# Patient Record
Sex: MALE | Race: WHITE | NOT HISPANIC OR LATINO | Employment: UNEMPLOYED | URBAN - METROPOLITAN AREA
[De-identification: names, ages, dates, MRNs, and addresses within clinical notes are randomized per-mention and may not be internally consistent; named-entity substitution may affect disease eponyms.]

---

## 2025-03-12 ENCOUNTER — APPOINTMENT (OUTPATIENT)
Dept: RADIOLOGY | Facility: HOSPITAL | Age: 40
End: 2025-03-12
Payer: MEDICAID

## 2025-03-12 ENCOUNTER — HOSPITAL ENCOUNTER (OUTPATIENT)
Facility: HOSPITAL | Age: 40
Setting detail: OBSERVATION
LOS: 1 days | Discharge: HOME | End: 2025-03-14
Attending: EMERGENCY MEDICINE | Admitting: EMERGENCY MEDICINE
Payer: MEDICAID

## 2025-03-12 ENCOUNTER — TELEPHONE (OUTPATIENT)
Dept: NEUROLOGY | Facility: CLINIC | Age: 40
End: 2025-03-12
Payer: MEDICAID

## 2025-03-12 DIAGNOSIS — G35 MS (MULTIPLE SCLEROSIS) (MULTI): Primary | ICD-10-CM

## 2025-03-12 LAB
ALBUMIN SERPL BCP-MCNC: 5.1 G/DL (ref 3.4–5)
ALP SERPL-CCNC: 55 U/L (ref 33–120)
ALT SERPL W P-5'-P-CCNC: 6 U/L (ref 10–52)
ANION GAP SERPL CALC-SCNC: 11 MMOL/L (ref 10–20)
APPEARANCE UR: ABNORMAL
AST SERPL W P-5'-P-CCNC: 11 U/L (ref 9–39)
BASOPHILS # BLD AUTO: 0.03 X10*3/UL (ref 0–0.1)
BASOPHILS # BLD AUTO: 0.04 X10*3/UL (ref 0–0.1)
BASOPHILS NFR BLD AUTO: 0.5 %
BASOPHILS NFR BLD AUTO: 0.7 %
BILIRUB SERPL-MCNC: 1 MG/DL (ref 0–1.2)
BILIRUB UR STRIP.AUTO-MCNC: NEGATIVE MG/DL
BUN SERPL-MCNC: 23 MG/DL (ref 6–23)
CALCIUM SERPL-MCNC: 9.7 MG/DL (ref 8.6–10.6)
CHLORIDE SERPL-SCNC: 106 MMOL/L (ref 98–107)
CO2 SERPL-SCNC: 26 MMOL/L (ref 21–32)
COLOR UR: YELLOW
CREAT SERPL-MCNC: 0.88 MG/DL (ref 0.5–1.3)
EGFRCR SERPLBLD CKD-EPI 2021: >90 ML/MIN/1.73M*2
EOSINOPHIL # BLD AUTO: 0.03 X10*3/UL (ref 0–0.7)
EOSINOPHIL # BLD AUTO: 0.03 X10*3/UL (ref 0–0.7)
EOSINOPHIL NFR BLD AUTO: 0.5 %
EOSINOPHIL NFR BLD AUTO: 0.6 %
ERYTHROCYTE [DISTWIDTH] IN BLOOD BY AUTOMATED COUNT: 11.6 % (ref 11.5–14.5)
ERYTHROCYTE [DISTWIDTH] IN BLOOD BY AUTOMATED COUNT: 11.7 % (ref 11.5–14.5)
GLUCOSE SERPL-MCNC: 96 MG/DL (ref 74–99)
GLUCOSE UR STRIP.AUTO-MCNC: NORMAL MG/DL
HBV CORE AB SER QL: NONREACTIVE
HBV SURFACE AB SER-ACNC: <3.1 MIU/ML
HBV SURFACE AG SERPL QL IA: NONREACTIVE
HCT VFR BLD AUTO: 40.6 % (ref 41–52)
HCT VFR BLD AUTO: 44.1 % (ref 41–52)
HCV AB SER QL: NONREACTIVE
HGB BLD-MCNC: 15 G/DL (ref 13.5–17.5)
HGB BLD-MCNC: 16.2 G/DL (ref 13.5–17.5)
HIV 1+2 AB+HIV1 P24 AG SERPL QL IA: NONREACTIVE
HOLD SPECIMEN: NORMAL
IGA SERPL-MCNC: 180 MG/DL (ref 70–400)
IGG SERPL-MCNC: 753 MG/DL (ref 700–1600)
IGM SERPL-MCNC: 155 MG/DL (ref 40–230)
IMM GRANULOCYTES # BLD AUTO: 0.02 X10*3/UL (ref 0–0.7)
IMM GRANULOCYTES # BLD AUTO: 0.02 X10*3/UL (ref 0–0.7)
IMM GRANULOCYTES NFR BLD AUTO: 0.4 % (ref 0–0.9)
IMM GRANULOCYTES NFR BLD AUTO: 0.4 % (ref 0–0.9)
KETONES UR STRIP.AUTO-MCNC: NEGATIVE MG/DL
LEUKOCYTE ESTERASE UR QL STRIP.AUTO: ABNORMAL
LYMPHOCYTES # BLD AUTO: 1.02 X10*3/UL (ref 1.2–4.8)
LYMPHOCYTES # BLD AUTO: 1.42 X10*3/UL (ref 1.2–4.8)
LYMPHOCYTES NFR BLD AUTO: 18.9 %
LYMPHOCYTES NFR BLD AUTO: 25.9 %
MCH RBC QN AUTO: 31.2 PG (ref 26–34)
MCH RBC QN AUTO: 31.8 PG (ref 26–34)
MCHC RBC AUTO-ENTMCNC: 36.7 G/DL (ref 32–36)
MCHC RBC AUTO-ENTMCNC: 36.9 G/DL (ref 32–36)
MCV RBC AUTO: 85 FL (ref 80–100)
MCV RBC AUTO: 86 FL (ref 80–100)
MONOCYTES # BLD AUTO: 0.42 X10*3/UL (ref 0.1–1)
MONOCYTES # BLD AUTO: 0.45 X10*3/UL (ref 0.1–1)
MONOCYTES NFR BLD AUTO: 7.8 %
MONOCYTES NFR BLD AUTO: 8.2 %
NEUTROPHILS # BLD AUTO: 3.54 X10*3/UL (ref 1.2–7.7)
NEUTROPHILS # BLD AUTO: 3.86 X10*3/UL (ref 1.2–7.7)
NEUTROPHILS NFR BLD AUTO: 64.5 %
NEUTROPHILS NFR BLD AUTO: 71.6 %
NITRITE UR QL STRIP.AUTO: NEGATIVE
NRBC BLD-RTO: 0 /100 WBCS (ref 0–0)
NRBC BLD-RTO: 0 /100 WBCS (ref 0–0)
PH UR STRIP.AUTO: 6 [PH]
PLATELET # BLD AUTO: 188 X10*3/UL (ref 150–450)
PLATELET # BLD AUTO: 221 X10*3/UL (ref 150–450)
POTASSIUM SERPL-SCNC: 3.7 MMOL/L (ref 3.5–5.3)
PROT SERPL-MCNC: 7.7 G/DL (ref 6.4–8.2)
PROT UR STRIP.AUTO-MCNC: ABNORMAL MG/DL
RBC # BLD AUTO: 4.71 X10*6/UL (ref 4.5–5.9)
RBC # BLD AUTO: 5.19 X10*6/UL (ref 4.5–5.9)
RBC # UR STRIP.AUTO: NEGATIVE MG/DL
RBC #/AREA URNS AUTO: NORMAL /HPF
SODIUM SERPL-SCNC: 139 MMOL/L (ref 136–145)
SP GR UR STRIP.AUTO: 1.03
SQUAMOUS #/AREA URNS AUTO: NORMAL /HPF
UROBILINOGEN UR STRIP.AUTO-MCNC: ABNORMAL MG/DL
VARICELLA ZOSTER IGG INDEX: 0.9 IA
VZV IGG SER QL IA: ABNORMAL
WBC # BLD AUTO: 5.4 X10*3/UL (ref 4.4–11.3)
WBC # BLD AUTO: 5.5 X10*3/UL (ref 4.4–11.3)
WBC #/AREA URNS AUTO: NORMAL /HPF

## 2025-03-12 PROCEDURE — 1210000001 HC SEMI-PRIVATE ROOM DAILY

## 2025-03-12 PROCEDURE — 70450 CT HEAD/BRAIN W/O DYE: CPT

## 2025-03-12 PROCEDURE — A9575 INJ GADOTERATE MEGLUMI 0.1ML: HCPCS | Mod: SE | Performed by: EMERGENCY MEDICINE

## 2025-03-12 PROCEDURE — 99285 EMERGENCY DEPT VISIT HI MDM: CPT | Performed by: PHYSICIAN ASSISTANT

## 2025-03-12 PROCEDURE — 82784 ASSAY IGA/IGD/IGG/IGM EACH: CPT | Performed by: STUDENT IN AN ORGANIZED HEALTH CARE EDUCATION/TRAINING PROGRAM

## 2025-03-12 PROCEDURE — 81001 URINALYSIS AUTO W/SCOPE: CPT | Performed by: NURSE PRACTITIONER

## 2025-03-12 PROCEDURE — 86769 SARS-COV-2 COVID-19 ANTIBODY: CPT | Performed by: STUDENT IN AN ORGANIZED HEALTH CARE EDUCATION/TRAINING PROGRAM

## 2025-03-12 PROCEDURE — 87340 HEPATITIS B SURFACE AG IA: CPT | Performed by: STUDENT IN AN ORGANIZED HEALTH CARE EDUCATION/TRAINING PROGRAM

## 2025-03-12 PROCEDURE — 84075 ASSAY ALKALINE PHOSPHATASE: CPT | Performed by: NURSE PRACTITIONER

## 2025-03-12 PROCEDURE — 86706 HEP B SURFACE ANTIBODY: CPT | Performed by: STUDENT IN AN ORGANIZED HEALTH CARE EDUCATION/TRAINING PROGRAM

## 2025-03-12 PROCEDURE — 87389 HIV-1 AG W/HIV-1&-2 AB AG IA: CPT | Performed by: STUDENT IN AN ORGANIZED HEALTH CARE EDUCATION/TRAINING PROGRAM

## 2025-03-12 PROCEDURE — 85025 COMPLETE CBC W/AUTO DIFF WBC: CPT | Performed by: STUDENT IN AN ORGANIZED HEALTH CARE EDUCATION/TRAINING PROGRAM

## 2025-03-12 PROCEDURE — 86803 HEPATITIS C AB TEST: CPT | Performed by: STUDENT IN AN ORGANIZED HEALTH CARE EDUCATION/TRAINING PROGRAM

## 2025-03-12 PROCEDURE — 36415 COLL VENOUS BLD VENIPUNCTURE: CPT | Performed by: STUDENT IN AN ORGANIZED HEALTH CARE EDUCATION/TRAINING PROGRAM

## 2025-03-12 PROCEDURE — 86787 VARICELLA-ZOSTER ANTIBODY: CPT | Performed by: STUDENT IN AN ORGANIZED HEALTH CARE EDUCATION/TRAINING PROGRAM

## 2025-03-12 PROCEDURE — 87086 URINE CULTURE/COLONY COUNT: CPT | Performed by: NURSE PRACTITIONER

## 2025-03-12 PROCEDURE — 86704 HEP B CORE ANTIBODY TOTAL: CPT | Performed by: STUDENT IN AN ORGANIZED HEALTH CARE EDUCATION/TRAINING PROGRAM

## 2025-03-12 PROCEDURE — 88185 FLOWCYTOMETRY/TC ADD-ON: CPT | Performed by: STUDENT IN AN ORGANIZED HEALTH CARE EDUCATION/TRAINING PROGRAM

## 2025-03-12 PROCEDURE — 36415 COLL VENOUS BLD VENIPUNCTURE: CPT | Performed by: NURSE PRACTITIONER

## 2025-03-12 PROCEDURE — 2550000001 HC RX 255 CONTRASTS: Mod: SE | Performed by: EMERGENCY MEDICINE

## 2025-03-12 PROCEDURE — 72157 MRI CHEST SPINE W/O & W/DYE: CPT

## 2025-03-12 PROCEDURE — 72156 MRI NECK SPINE W/O & W/DYE: CPT

## 2025-03-12 PROCEDURE — 70553 MRI BRAIN STEM W/O & W/DYE: CPT

## 2025-03-12 PROCEDURE — 70450 CT HEAD/BRAIN W/O DYE: CPT | Performed by: RADIOLOGY

## 2025-03-12 PROCEDURE — 99285 EMERGENCY DEPT VISIT HI MDM: CPT | Mod: 25 | Performed by: EMERGENCY MEDICINE

## 2025-03-12 PROCEDURE — 86481 TB AG RESPONSE T-CELL SUSP: CPT | Performed by: STUDENT IN AN ORGANIZED HEALTH CARE EDUCATION/TRAINING PROGRAM

## 2025-03-12 PROCEDURE — 72157 MRI CHEST SPINE W/O & W/DYE: CPT | Performed by: RADIOLOGY

## 2025-03-12 PROCEDURE — 99223 1ST HOSP IP/OBS HIGH 75: CPT | Performed by: STUDENT IN AN ORGANIZED HEALTH CARE EDUCATION/TRAINING PROGRAM

## 2025-03-12 PROCEDURE — 85025 COMPLETE CBC W/AUTO DIFF WBC: CPT | Performed by: NURSE PRACTITIONER

## 2025-03-12 PROCEDURE — 70553 MRI BRAIN STEM W/O & W/DYE: CPT | Performed by: RADIOLOGY

## 2025-03-12 PROCEDURE — 72156 MRI NECK SPINE W/O & W/DYE: CPT | Performed by: RADIOLOGY

## 2025-03-12 RX ORDER — GADOTERATE MEGLUMINE 376.9 MG/ML
15 INJECTION INTRAVENOUS
Status: COMPLETED | OUTPATIENT
Start: 2025-03-12 | End: 2025-03-12

## 2025-03-12 RX ADMIN — GADOTERATE MEGLUMINE 12 ML: 376.9 INJECTION INTRAVENOUS at 20:47

## 2025-03-12 ASSESSMENT — COLUMBIA-SUICIDE SEVERITY RATING SCALE - C-SSRS
6. HAVE YOU EVER DONE ANYTHING, STARTED TO DO ANYTHING, OR PREPARED TO DO ANYTHING TO END YOUR LIFE?: NO
2. HAVE YOU ACTUALLY HAD ANY THOUGHTS OF KILLING YOURSELF?: NO
1. IN THE PAST MONTH, HAVE YOU WISHED YOU WERE DEAD OR WISHED YOU COULD GO TO SLEEP AND NOT WAKE UP?: NO

## 2025-03-12 ASSESSMENT — PAIN - FUNCTIONAL ASSESSMENT: PAIN_FUNCTIONAL_ASSESSMENT: 0-10

## 2025-03-12 ASSESSMENT — PAIN SCALES - GENERAL
PAINLEVEL_OUTOF10: 0 - NO PAIN
PAINLEVEL_OUTOF10: 0 - NO PAIN

## 2025-03-12 NOTE — ED PROVIDER NOTES
"Emergency Department Encounter  [facility] EMERGENCY MEDICINE    Patient: Cale Armenta  MRN: [id number]  : 1985  Date of Evaluation: 3/12/2025  ED Provider: Lynne Natarajan PA-C        History of Present Illness     39 year old male who presented today with worsening Multiple Sclerosis symptoms. Pt states that he has not seen his neurologist since . He states that he did not have insurance and had trouble obtaining disability assistance. Pt states that he had a hard time walking within 20 feet. Baseline uses his cane to walk around and move around his house. The last 6 weeks, pt states that he had fallen twice, denies hitting his head or losing consciousness.  Denies any areas of pain from these falls.  Pt states that the last few weeks he feels like he was dragging his legs to walk, and with a lot of difficulty. Also complains of increasing numbness on his ride side from face down to his legs. Complains of increasing bilateral weakness and difficulty standing up. Complains of blurry vision on his R eye since 2024. States that he sees better on his lazy eye (L). Denies any headache or dizziness. Denies any fever, chills, or night sweats. Denies any shortness of breath or chest pain. Denies any abdominal pain, n & v, or diarrhea.       History provided by:  Patient   used: No             Visit Vitals  /74 (BP Location: Left arm, Patient Position: Sitting)   Pulse 88   Temp 37 °C (98.6 °F) (Temporal)   Resp 17   Ht 1.727 m (5' 8\")   Wt 59 kg (130 lb)   SpO2 96%   BMI 19.77 kg/m²   BSA 1.68 m²          Physical Exam       Triage vitals:  T 37 °C (98.6 °F)  HR 88  /74  RR 17  O2 96 % None (Room air)    Physical Exam     Physical exam:   General: Vitals noted, no distress. Afebrile.   EENT:  Hearing grossly intact. Normal phonation. MMM. Airway patient. PERRL. EOMI.   Neck: No midline tenderness or paraspinal tenderness. FROM.   Cardiac: Regular, " rate, rhythm. Normal S1 and S2.  No murmurs, gallops, rubs.   Pulmonary: Good air exchange. Lungs clear bilaterally. No wheezes, rhonchi, rales. No accessory muscle use.   Abdomen: Soft, nonsurgical. Nontender. No peritoneal signs.   Back: No CVA tenderness. No midline tenderness or paraspinal tenderness. No obvious deformity or step off.   Extremities: No peripheral edema.  Full range of motion. Moves all extremities freely. No tenderness throughout extremities.   Skin: No rash. Warm and Dry.   Neuro: No focal neurologic deficits. CN 2-12 grossly intact. Sensation equal bilaterally.  4 out of 5 strength bilateral lower extremities.  5 out of 5 strength bilateral upper extremities.  Slow unsteady gait.      Results       Labs Reviewed   CBC WITH AUTO DIFFERENTIAL - Abnormal       Result Value    WBC 5.4      nRBC 0.0      RBC 5.19      Hemoglobin 16.2      Hematocrit 44.1      MCV 85      MCH 31.2      MCHC 36.7 (*)     RDW 11.7      Platelets 221      Neutrophils % 71.6      Immature Granulocytes %, Automated 0.4      Lymphocytes % 18.9      Monocytes % 7.8      Eosinophils % 0.6      Basophils % 0.7      Neutrophils Absolute 3.86      Immature Granulocytes Absolute, Automated 0.02      Lymphocytes Absolute 1.02 (*)     Monocytes Absolute 0.42      Eosinophils Absolute 0.03      Basophils Absolute 0.04     COMPREHENSIVE METABOLIC PANEL - Abnormal    Glucose 96      Sodium 139      Potassium 3.7      Chloride 106      Bicarbonate 26      Anion Gap 11      Urea Nitrogen 23      Creatinine 0.88      eGFR >90      Calcium 9.7      Albumin 5.1 (*)     Alkaline Phosphatase 55      Total Protein 7.7      AST 11      Bilirubin, Total 1.0      ALT 6 (*)    URINALYSIS WITH REFLEX CULTURE AND MICROSCOPIC    Narrative:     The following orders were created for panel order Urinalysis with Reflex Culture and Microscopic.  Procedure                               Abnormality         Status                     ---------                                -----------         ------                     Urinalysis with Reflex Cu...[[id number]]                                                 Extra Urine Gray Tube[[id number]]                                                          Please view results for these tests on the individual orders.   URINALYSIS WITH REFLEX CULTURE AND MICROSCOPIC   EXTRA URINE GRAY TUBE       CT head wo IV contrast   Final Result   Periventricular and deep white matter hypodensities bilateral   cerebral hemispheres with a parietal and temporal predominance   suggestive of the primary demyelinating disease process. The report   of the 2021 MRI describes extensive demyelinating disease process.   However, those images are not currently available for review.             MACRO:   None        Signed by: Peyman Tyson 3/12/2025 1:41 PM   Dictation workstation:   [id number]            Medical Decision Making & ED Course         ED Course & MDM     Medical Decision Making  This is a 39-year-old male with past history of multiple sclerosis who presents to the ED with progressive generalized weakness, difficulty ambulating as well as worsening paresthesias on the right side of his body.  Vital stable upon arrival to the ED.  On examination patient did have some weakness noted to bilateral lower extremities.  Full strength to bilateral upper extremities.  No noticeable facial droop.  Clear speech.  Patient was able to ambulate a few steps with his cane but did have a very slow and unsteady appearing gait.  CT head had been obtained in triage and did show multiple lesions concerning for demyelinating process.  CBC and CMP obtained and were grossly unremarkable.  Neurology consulted for this patient.  Patient signed out to oncoming team pending neurology evaluation and recommendations.                 Independent Result Review and Interpretation: CT head as interpreted by me revealed no intracranial bleed or skull fracture.  Multiple.   White matter hypodensities noted to bilateral cerebral hemispheres.          Disposition   Patient was signed out to oncoming team at 1500 pending completion of their work-up.  Please see the next provider's transition of care note for the remainder of the patient's care.     Procedures     This was a shared visit with an ED attending.  The patient was seen and discussed with the ED attending    Procedures    Lynne Natarajan PA-C  Emergency Medicine      Lynne Natarajan PA-C  03/12/25 [id number]

## 2025-03-12 NOTE — ED TRIAGE NOTES
Pt to ED with c/o needing his disability papers signed. Asking to see neuro doctor on call. Endorsing muscle wasting in both legs, and sensation decreased in R side    Developing neuro condition (possible MS)?

## 2025-03-12 NOTE — PROGRESS NOTES
Emergency Medicine Transition of Care Note.    I received Cale Armenta in signout from Lynne rios PA-C.  Please see the previous ED provider note for all HPI, PE and MDM up to the time of signout at 3 PM. This is in addition to the primary record.    In brief Cale Armenta is an 39 y.o. male presenting for   Chief Complaint   Patient presents with    Gait Problem     At the time of signout we were awaiting: Neurology recommendations and likely admission    MDM:  Patient is a 39-year-old male presenting with worsening multiple sclerosis symptoms that are chronic in nature, has global weakness worse in the legs as well as right-sided paresthesias.  CT head obtained showing multiple lesions concerning for demyelinating process consistent with multiple sclerosis.  Laboratory otherwise unremarkable.  Neurology recommends MRI brain as well as cervical and thoracic spine then likely to start disease modifying therapy.  Neurology to place orders.  Patient admitted to neurology.  Diagnoses as of 03/12/25 3420   MS (multiple sclerosis) (Multi)       Aramis Dietz MD

## 2025-03-12 NOTE — ED TRIAGE NOTES
This patient was seen in triage.     Vitals are noted.      HPI:  Patient is a 39-year-old male with past medical history of multiple sclerosis, was seen by neurology but last visit was in 2021, over the last few years patient has been getting progressively worse with his gait, sensation to the right side of the face, called his neurologist who has not seen him for a few years and was told to come into the ER for evaluation and reestablishment.  Has not undergone any treatment for multiple sclerosis.  Denies any fevers, chills, chest pain, shortness of breath, abdominal pain.    Focused PE:  Gen: Well-appearing, not in acute distress  Cardiovascular: Regular rate, normal rhythm, no murmur, no gallop  Respiratory: No adventitious lung sounds auscultated.  Abdomen: No reproducible abdominal tenderness upon palpation,  physical exam may be limited by patient positioning sitting up in a chair  Neuro:  Alert and Oriented, speech clear and coherent     Plan:  IV, lab work, imaging, neurology consult        For the remainder of the patient's workup and ED course, please see the main ED provider note.  We discussed need for diagnostic testing including lab studies and imaging.  We also discussed that they may be asked to wait in the waiting room while these test are pending.  They understand that if they choose to leave without having the testing completed or resulted that we cannot rule out acute life-threatening illnesses and the risks involved to lead to worsening condition, permanent disability or even death.

## 2025-03-12 NOTE — TELEPHONE ENCOUNTER
Dr Bandar Donovan (community doctor with [facility] doctor) He has been helping Cale with his healthcare while he has not had medical insurance. He called with concerns that Cale is having significant decline relating to his  possible MS. Cale now has insurance and has an office visit with Dr Justice for May. Dr Donovan was hoping to expedite his care. He will send Cale to the [facility] ER for a possible flare-up of his neurological disorder.     Cale is agreeable to come to [facility] today.

## 2025-03-12 NOTE — H&P
"Reason for Consult:   \"Worsening MS\"     Chief Complaint: Gait disturbance, visual change, numbness     History of Present Illness:   Cale Armenta is a 38 yo male with hx of unspecified demyelinating disease (last seen in neurology clinic Aug 2021), bipolar disorder (unclear if type I or type II), and PTSD presenting to ED for re-initiation of neurologic care after noting worsening neurologic symptoms over the last year.     Pt notes his walking has worsened over the course of the year and he now uses a cane to walk. He finds it difficult to lift up his feet, feels his muscles are weak, and feels as if his legs aren't \"doing what his mind is telling them to do.\" He feels his walking is best in the morning and worsens after being active for even an hour or so. He is no longer able to walk even short distances without assistance. He has had 2 falls in the last 6 months. Over this same time period he has noted worsening of his vision, specifically in his right eye. He has had glasses since ~10 years of age but historically his right eye was always better than his left. Over the last year that has changed and he now notes significant double vision and blurry vision. He often has to close his right eye in order to see. Visual changes have been painless. He has also noted some changes in color perception, for example seeing the yellow stoplights as white. Of note he has not gotten a new prescription for glasses in the last 2-3 years due to financial issues and has not had a recent eye exam. Pt also states that he has had sensory changes, largely on the right side of his body. He feels \"numb\" on the right side, noting his right fingers (not thumb) to be the worst. Pt is right handed and has difficulties writing after awhile because of the numbness. However the hand remains functional, and he is still able to  things and use the hand normally. There is some involvement of the left side, specifically right above the " umbilicus but the majority of numbness is happening on the right side. Pt denies any current sick symptoms including fevers, N/V/D. He also denies any significant illnesses ~1 year ago when symptoms started to worsen. Denies any syncopal events. Denies any hx of seizures. Denies any weakness/sensory changes involving his facial muscles and denies voice changes. No loss of bowel/bladder control. No noted tremors. No muscle spasms. Denies any mood changes, but has noted some slight cognitive changes, specifically noting that he has difficulty remembering details he used to be able to remember (ex bible verses). Denies any significant headache hx. Currently is not taking any medications including over the counter medications or herbal supplements.     Per chart review:      Pt was previously following with Dr. Justice in outpatient clinic. He initially presented as a self-referral for work up to rule out autoimmune encephalitis as a possible cause of his psychiatric symptoms. MRI was obtained (Summer 2020) which showed extensive periventricular, juxtacortical, brainstem, and cerebellar non-enhancing demyelinating lesions. Sagittal FLAIR also shows a short segment demyelinating lesion in cord. His cervical and thoracic MRIs showed multiple typical short segment demyelinating lesions with report of vague enhancement at the C5 level. However at that time he was not having any neurologic symptoms other than some report of urinary frequency. HIs labs for MS mimics came back negative for AQP, MOG, and ENS1 antibodies. His ACE level came back extremely elevated, however chest CT did not show evidence of sarcoidosis. Pt declined spinal tap at that time and given lack of neurologic symptoms decision was made to monitor with serial MRIs rather than initiating DMT. Subsequent MRIs in Jan 2021 and Aug 2021 were stable and pt continued without new neurologic symptoms. He was ultimately lost to outpatient follow up due to  "insurance/financial issues. Differential diagnosis during outpatient care included, remote ADEM versus RIS, versus atypical childhood onset MS (mother reported concern of symptom onset in childhood at ~7-8 years old).        Relevant Labs:  CMP largely WNL  CBC with differential largely within normal limits (mildly decreased absolute lymphocyte count)     Imaging:  CT head without contrast showed periventricular and deep white matter hypodensities in bilateral cerebral hemispheres with parietal and temporal predominance    MRI brain 6/19/21: Images personally reviewed there is extensive white matters lesions that are periventricular in nature on FLAIR; there are additional black hole lesions distal to these on T1. GRE without evidence of hemorrhage. No contrast enhancement or diffusion restirction.    MRI C spine 8/8/2020: Personally reviewed there is one T2 hyperintense lesion on STIR and T2 cigar shaped in nature at the level of T1. No contrast enhancement noted.        Review of Systems:   weakness: Yes, feels legs are weak   Sensory changes: Yes, largely right sided \"numbness\"  incoordination: Yes  falling: Yes, twice in last 6 weeks  gait change: Yes, and now requiring cane  painful vision loss: No  double vision: Yes, with blurry vision/cloudy vision (R>L)  vertigo: No  facial/bulbar weakness: No  Lhermitte's: No  bladder/bowel dysfunction: Denies loss of bowel/bladder control. Did not disclose recent hx of urinary frequency.     spasticity: No  tonic spasms: No  tremors: No  RLS: No  dystonia: No     heat sensitivity: Not asked  fatigue: No  depression: no   anxiety: No  cognitive changes: increasing difficulty remembering details, but no significant functional decline     - Other pertinent negatives: Recent illness, fever, chills, chest pain, SOB, palpitations, N/V/D/C, syncope, h/o stroke/TIA, h/o seizures, AC/Aplt use, no AVH, no recent manic sx including increased energy or insomnia     -Other pertinent " "positives: recent weight loss (although pt reports it to be intentional/situational related to recent financial stressors), reports tension headaches that resolve spontaneously without medication (no photophobia, phonophobia, or N/V)     - All systems reviewed and negative except as stated above     Past Medical History:  Bipolar Disorder (unclear Type I vs Type II, pt denies this hx)  PTSD  Asthma (remote hx, not using any inhalers)  Kidney stones     Surgical History:     Surgical History         Past Surgical History:   Procedure Laterality Date    ELBOW SURGERY   12/29/2014     Elbow Surgery    MOUTH SURGERY   12/29/2014     Oral Surgery Tooth Extraction            Home Meds:  Not currently taking any home medications including prescribed medications, over the counter medications, and herbal supplements.     Allergies:    RX Allergies   No Known Allergies         Social History:   - Living situation: Lives with his mother.   - Baseline function: No longer working due to increasing difficulties with mobility.  - Occupation: Unemployed  - Tobacco use:  Denies current use (has hx of nicotine use on chart review)  - Alcohol use: Denies  - Illicit drug use: Denies (has hx of marijuana and other elicit substance use)  -Pt reports he has not used any substances including nicotine or alcohol in \"years\"     Family History:   - Denies any family hx of autoimmune diseases including diabetes, thyroid disease, MS, lupus. No known hx of neurologic disease.       Physical Exam:  General: Laying in bed. No acute distress.   Skin: No visible rashes  Heart: Regular rate and rhythm, No murmurs, rubs, gallops.  Lungs: Normal aeration throughout all lung fields. Clear to auscultation without wheeze or crackles.  Abdomen: Soft, non-distended, non-tender to palpation. Normal bowel sounds throughout all quadrants.  Extremities: No lower extremity edema noted.      Neurological Exam:  MENTAL STATUS:  General appearance: Slightly " disheveled, skinny adult male  Orientation: Oriented to person, place, time, and situation.  Language: Expression, repetition, naming, comprehension intact  Thought processes: Logical, largely well organized. Mildly tangential.  Concentration: Intact  Fund of knowledge: Appropriate  Judgment and Insight: Intact     CRANIAL NERVES:  - II/III: PERRL  - II: +red desaturation in right eye. Vision blurred in right eye as compared to left, but bilateral visual fields grossly intact when tested individually and together  - III, IV, VI: EOM full to pursuit without nystagmus  - V: V1-V3 sensation intact bilaterally  - VII: Face muscles symmetric with smile and eye closure  - VIII: Intact to interview  - IX, X: Palate elevated symmetrically bilaterally, no hoarseness  - XI: 5/5 strength on shoulder shrugging bilaterally  - XII: Tongue midline without atrophy or fasciculation     MOTOR: BL LE spasticity with knee pop     STRENGTH:      R L  Deltoid             5          5  Biceps              5          5  Triceps             5          5                    5          5     Hip flexion 5 5  Quadriceps 5 5  Hamstrings 5 5  DorsiFlex     5 5  PlantarFlex 5 5     REFLEXES:        R L  Biceps              3 3  Triceps             3 3  Patellar             3          +2  Achilles            4 3        COORDINATION: Intact on finger to nose bl, intact on heel to shin bl, ULICES intact bl  SENSORY: Intact to light touch in bl UE and LE  PROPRIOCEPTION:  Intact in toes and fingers bilaterally  ROMBERG: Positive; noted to sway even prior to testing indicating mixed cerebellar tract and sensory components of imbalance  GAIT: Broad based ataxic gait with mild spastic scissoring        Assessment  Cale Armenta is a 40 YO RH W M with last seen in neurology clinic Aug 2021 who has a PMH of bipolar disorder (unclear if type I or type II), and PTSD presenting to ED for re-initiation of neurologic care after noting worsening neurologic  symptoms over the last year.      On presentation today, pt reports worsening neurologic symptoms over the past year including difficulties with gait, vision changes in the right eye, and sensory changes on the right side. 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 Will need MRI to assess presence of new lesions as well as to establish whether there is active MS flare. Will likely need PMS DMT as outpatient ie) Ocrelizumab.     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  #Gait ataxia, spasticity   - Admit to neurology service  - Obtain MRI brain, C, and T spine with and without contrast  - Consider IVMP pulse dose and PPI if showing active demyelination   - Consider OCT as outpatient given chronic right eye red desaturation  - Obtain following labs for DMT: TB spot, CHELLE IgG reflex, Hep B/C screens, VZV IgG, Covid IgG, HIV, and Immunoglobulin levels, and B cell phenotyping    F: None  E: K > 4, Phos >3, Mg > 2  N: Regular Diet  A: PIV  P: SCDs     Dispo: PT/OT evaluation TBD    Gia Vera MD   PGY-4 Psychiatry    -----------------------------------------------------------------  Senior Resident Addendum:  I examined & discussed the patient above. I have reviewed and agree with the excellent note above.     Reggie Covington MD  PGY-4 Neurology  General Neurology [id number]

## 2025-03-13 ENCOUNTER — PHARMACY VISIT (OUTPATIENT)
Dept: PHARMACY | Facility: CLINIC | Age: 40
End: 2025-03-13
Payer: MEDICAID

## 2025-03-13 LAB
BACTERIA UR CULT: NORMAL
HOLD SPECIMEN: NORMAL

## 2025-03-13 PROCEDURE — 97165 OT EVAL LOW COMPLEX 30 MIN: CPT | Mod: GO

## 2025-03-13 PROCEDURE — G0378 HOSPITAL OBSERVATION PER HR: HCPCS

## 2025-03-13 PROCEDURE — 2500000001 HC RX 250 WO HCPCS SELF ADMINISTERED DRUGS (ALT 637 FOR MEDICARE OP): Mod: SE | Performed by: STUDENT IN AN ORGANIZED HEALTH CARE EDUCATION/TRAINING PROGRAM

## 2025-03-13 PROCEDURE — RXMED WILLOW AMBULATORY MEDICATION CHARGE

## 2025-03-13 PROCEDURE — 99233 SBSQ HOSP IP/OBS HIGH 50: CPT

## 2025-03-13 PROCEDURE — 97116 GAIT TRAINING THERAPY: CPT | Mod: GP

## 2025-03-13 PROCEDURE — 97161 PT EVAL LOW COMPLEX 20 MIN: CPT | Mod: GP

## 2025-03-13 RX ORDER — ERGOCALCIFEROL 1.25 MG/1
1250 CAPSULE ORAL WEEKLY
Qty: 25 CAPSULE | Refills: 1 | Status: SHIPPED | OUTPATIENT
Start: 2025-03-13

## 2025-03-13 RX ORDER — ERGOCALCIFEROL 1.25 MG/1
1250 CAPSULE ORAL
Status: DISCONTINUED | OUTPATIENT
Start: 2025-03-13 | End: 2025-03-14 | Stop reason: HOSPADM

## 2025-03-13 RX ORDER — CALCIUM CARBONATE 160(400)MG
1 TABLET,CHEWABLE ORAL DAILY
Qty: 1 EACH | Refills: 0 | Status: SHIPPED | OUTPATIENT
Start: 2025-03-13

## 2025-03-13 RX ORDER — ACETAMINOPHEN 325 MG/1
650 TABLET ORAL EVERY 4 HOURS PRN
Status: DISCONTINUED | OUTPATIENT
Start: 2025-03-13 | End: 2025-03-14 | Stop reason: HOSPADM

## 2025-03-13 RX ADMIN — ERGOCALCIFEROL 1250 MCG: 1.25 CAPSULE ORAL at 08:00

## 2025-03-13 SDOH — ECONOMIC STABILITY: FOOD INSECURITY: HOW HARD IS IT FOR YOU TO PAY FOR THE VERY BASICS LIKE FOOD, HOUSING, MEDICAL CARE, AND HEATING?: PATIENT DECLINED

## 2025-03-13 SDOH — SOCIAL STABILITY: SOCIAL INSECURITY
WITHIN THE LAST YEAR, HAVE YOU BEEN RAPED OR FORCED TO HAVE ANY KIND OF SEXUAL ACTIVITY BY YOUR PARTNER OR EX-PARTNER?: PATIENT DECLINED

## 2025-03-13 SDOH — ECONOMIC STABILITY: INCOME INSECURITY
IN THE PAST 12 MONTHS HAS THE ELECTRIC, GAS, OIL, OR WATER COMPANY THREATENED TO SHUT OFF SERVICES IN YOUR HOME?: PATIENT DECLINED

## 2025-03-13 SDOH — SOCIAL STABILITY: SOCIAL INSECURITY: WERE YOU ABLE TO COMPLETE ALL THE BEHAVIORAL HEALTH SCREENINGS?: NO

## 2025-03-13 SDOH — SOCIAL STABILITY: SOCIAL INSECURITY: ARE YOU OR HAVE YOU BEEN THREATENED OR ABUSED PHYSICALLY, EMOTIONALLY, OR SEXUALLY BY ANYONE?: NO

## 2025-03-13 SDOH — SOCIAL STABILITY: SOCIAL INSECURITY: DOES ANYONE TRY TO KEEP YOU FROM HAVING/CONTACTING OTHER FRIENDS OR DOING THINGS OUTSIDE YOUR HOME?: NO

## 2025-03-13 SDOH — SOCIAL STABILITY: SOCIAL INSECURITY: HAS ANYONE EVER THREATENED TO HURT YOUR FAMILY OR YOUR PETS?: NO

## 2025-03-13 SDOH — ECONOMIC STABILITY: HOUSING INSECURITY: IN THE LAST 12 MONTHS, WAS THERE A TIME WHEN YOU WERE NOT ABLE TO PAY THE MORTGAGE OR RENT ON TIME?: PATIENT DECLINED

## 2025-03-13 SDOH — SOCIAL STABILITY: SOCIAL INSECURITY: DO YOU FEEL ANYONE HAS EXPLOITED OR TAKEN ADVANTAGE OF YOU FINANCIALLY OR OF YOUR PERSONAL PROPERTY?: NO

## 2025-03-13 SDOH — SOCIAL STABILITY: SOCIAL INSECURITY: DO YOU FEEL UNSAFE GOING BACK TO THE PLACE WHERE YOU ARE LIVING?: NO

## 2025-03-13 SDOH — ECONOMIC STABILITY: HOUSING INSECURITY: IN THE PAST 12 MONTHS, HOW MANY TIMES HAVE YOU MOVED WHERE YOU WERE LIVING?: 0

## 2025-03-13 SDOH — ECONOMIC STABILITY: TRANSPORTATION INSECURITY
IN THE PAST 12 MONTHS, HAS LACK OF TRANSPORTATION KEPT YOU FROM MEDICAL APPOINTMENTS OR FROM GETTING MEDICATIONS?: PATIENT DECLINED

## 2025-03-13 SDOH — ECONOMIC STABILITY: FOOD INSECURITY: WITHIN THE PAST 12 MONTHS, THE FOOD YOU BOUGHT JUST DIDN'T LAST AND YOU DIDN'T HAVE MONEY TO GET MORE.: PATIENT DECLINED

## 2025-03-13 SDOH — SOCIAL STABILITY: SOCIAL INSECURITY: HAVE YOU HAD ANY THOUGHTS OF HARMING ANYONE ELSE?: NO

## 2025-03-13 SDOH — SOCIAL STABILITY: SOCIAL INSECURITY: HAVE YOU HAD THOUGHTS OF HARMING ANYONE ELSE?: NO

## 2025-03-13 SDOH — SOCIAL STABILITY: SOCIAL INSECURITY: WITHIN THE LAST YEAR, HAVE YOU BEEN AFRAID OF YOUR PARTNER OR EX-PARTNER?: PATIENT DECLINED

## 2025-03-13 SDOH — ECONOMIC STABILITY: FOOD INSECURITY
WITHIN THE PAST 12 MONTHS, YOU WORRIED THAT YOUR FOOD WOULD RUN OUT BEFORE YOU GOT THE MONEY TO BUY MORE.: PATIENT DECLINED

## 2025-03-13 SDOH — SOCIAL STABILITY: SOCIAL INSECURITY: ABUSE: ADULT

## 2025-03-13 SDOH — SOCIAL STABILITY: SOCIAL INSECURITY
WITHIN THE LAST YEAR, HAVE YOU BEEN KICKED, HIT, SLAPPED, OR OTHERWISE PHYSICALLY HURT BY YOUR PARTNER OR EX-PARTNER?: PATIENT DECLINED

## 2025-03-13 SDOH — ECONOMIC STABILITY: HOUSING INSECURITY: AT ANY TIME IN THE PAST 12 MONTHS, WERE YOU HOMELESS OR LIVING IN A SHELTER (INCLUDING NOW)?: PATIENT DECLINED

## 2025-03-13 SDOH — SOCIAL STABILITY: SOCIAL INSECURITY: ARE THERE ANY APPARENT SIGNS OF INJURIES/BEHAVIORS THAT COULD BE RELATED TO ABUSE/NEGLECT?: NO

## 2025-03-13 SDOH — SOCIAL STABILITY: SOCIAL INSECURITY
WITHIN THE LAST YEAR, HAVE YOU BEEN HUMILIATED OR EMOTIONALLY ABUSED IN OTHER WAYS BY YOUR PARTNER OR EX-PARTNER?: PATIENT DECLINED

## 2025-03-13 ASSESSMENT — COGNITIVE AND FUNCTIONAL STATUS - GENERAL
MOBILITY SCORE: 20
WALKING IN HOSPITAL ROOM: A LITTLE
DAILY ACTIVITIY SCORE: 24
STANDING UP FROM CHAIR USING ARMS: A LITTLE
MOBILITY SCORE: 21
HELP NEEDED FOR BATHING: A LITTLE
MOBILITY SCORE: 21
PATIENT BASELINE BEDBOUND: NO
MOVING TO AND FROM BED TO CHAIR: A LITTLE
DAILY ACTIVITIY SCORE: 24
MOBILITY SCORE: 22
CLIMB 3 TO 5 STEPS WITH RAILING: A LITTLE
DRESSING REGULAR LOWER BODY CLOTHING: A LITTLE
WALKING IN HOSPITAL ROOM: A LITTLE
TOILETING: A LITTLE
PATIENT BASELINE BEDBOUND: NO
CLIMB 3 TO 5 STEPS WITH RAILING: A LITTLE
WALKING IN HOSPITAL ROOM: A LITTLE
CLIMB 3 TO 5 STEPS WITH RAILING: A LOT
CLIMB 3 TO 5 STEPS WITH RAILING: A LOT
DRESSING REGULAR UPPER BODY CLOTHING: A LITTLE
WALKING IN HOSPITAL ROOM: A LITTLE
DAILY ACTIVITIY SCORE: 20
DAILY ACTIVITIY SCORE: 24

## 2025-03-13 ASSESSMENT — ACTIVITIES OF DAILY LIVING (ADL)
LACK_OF_TRANSPORTATION: NO
WALKS IN HOME: INDEPENDENT
FEEDING YOURSELF: INDEPENDENT
ADL_ASSISTANCE: INDEPENDENT
ADEQUATE_TO_COMPLETE_ADL: YES
BATHING: INDEPENDENT
ADL_ASSISTANCE: INDEPENDENT
HEARING - RIGHT EAR: FUNCTIONAL
BATHING_ASSISTANCE: STAND BY
DRESSING YOURSELF: INDEPENDENT
GROOMING: INDEPENDENT
PATIENT'S MEMORY ADEQUATE TO SAFELY COMPLETE DAILY ACTIVITIES?: YES
JUDGMENT_ADEQUATE_SAFELY_COMPLETE_DAILY_ACTIVITIES: YES
HEARING - LEFT EAR: FUNCTIONAL
TOILETING: INDEPENDENT
LACK_OF_TRANSPORTATION: PATIENT DECLINED

## 2025-03-13 ASSESSMENT — LIFESTYLE VARIABLES
AUDIT-C TOTAL SCORE: 0
AUDIT-C TOTAL SCORE: 0
HOW OFTEN DO YOU HAVE 6 OR MORE DRINKS ON ONE OCCASION: NEVER
HOW OFTEN DO YOU HAVE A DRINK CONTAINING ALCOHOL: NEVER
HOW MANY STANDARD DRINKS CONTAINING ALCOHOL DO YOU HAVE ON A TYPICAL DAY: PATIENT DOES NOT DRINK
SKIP TO QUESTIONS 9-10: 1

## 2025-03-13 ASSESSMENT — PAIN SCALES - GENERAL
PAINLEVEL_OUTOF10: 0 - NO PAIN

## 2025-03-13 ASSESSMENT — PATIENT HEALTH QUESTIONNAIRE - PHQ9
2. FEELING DOWN, DEPRESSED OR HOPELESS: NOT AT ALL
1. LITTLE INTEREST OR PLEASURE IN DOING THINGS: NOT AT ALL
SUM OF ALL RESPONSES TO PHQ9 QUESTIONS 1 & 2: 0

## 2025-03-13 ASSESSMENT — PAIN - FUNCTIONAL ASSESSMENT
PAIN_FUNCTIONAL_ASSESSMENT: 0-10

## 2025-03-13 NOTE — CARE PLAN
The clinical goals for the shift include pt will maintain safety, free from falls and injuries throughout this shift    Over the shift, the patient did make progress toward the following goals.       Problem: Pain - Adult  Goal: Verbalizes/displays adequate comfort level or baseline comfort level  Outcome: Progressing     Problem: Safety - Adult  Goal: Free from fall injury  Outcome: Progressing     Problem: Discharge Planning  Goal: Discharge to home or other facility with appropriate resources  Outcome: Progressing     Problem: Chronic Conditions and Co-morbidities  Goal: Patient's chronic conditions and co-morbidity symptoms are monitored and maintained or improved  Outcome: Progressing     Problem: Nutrition  Goal: Nutrient intake appropriate for maintaining nutritional needs  Outcome: Progressing

## 2025-03-13 NOTE — PROGRESS NOTES
03/13/25 1307   Discharge Planning   Living Arrangements Family members   Support Systems Family members   Assistance Needed Per patient indpendent of ADLs, iADLs   Type of Residence Private residence   Number of Stairs to Enter Residence 0  (elevator access to apartment)   Number of Stairs Within Residence 0   Who is requesting discharge planning? Provider   Home or Post Acute Services Other (Comment)  (Pt does not have a PCP, agreeable to outpatient therapy.)   Expected Discharge Disposition Home   Does the patient need discharge transport arranged? No  (Per patient his friend to provide transport.)   Financial Resource Strain   How hard is it for you to pay for the very basics like food, housing, medical care, and heating? Somewhat   Housing Stability   In the last 12 months, was there a time when you were not able to pay the mortgage or rent on time? N   In the past 12 months, how many times have you moved where you were living? 0   At any time in the past 12 months, were you homeless or living in a shelter (including now)? N   Transportation Needs   In the past 12 months, has lack of transportation kept you from medical appointments or from getting medications? no   In the past 12 months, has lack of transportation kept you from meetings, work, or from getting things needed for daily living? No     Met with pt and introduced myself as Care Coordinator with Care Transitions Team for Discharge Planning. Pt stated he lives at home with his mother and is her caregiver. Stated he feels safe at home. Pt utilizes insurance transportation to [facility]. Pt's address, phone number and contact information was verified. Pt endorsed some difficulty with paying bills and buying food. This nurse emailed the CHWs to assist.   Pt aware that PT/OT rec'ed low intensity therapy, agreeable to outpatient therapy due to not having a PCP. MD Galaviz notified of need for outpatient PT/OT referral in addition to script for kelly.    Diabetes: none  Hemodialysis: none  Home Healthcare: none  DME: pt owns a cane.  PCP: Pt assigned a PCP via [facility], has not established care.  Pharmacy: Pt requested meds to bed.   Per pt his friend will be able to provide transportation home at 7pm.     Addendum 1419: Freda to deliver rollator to bedside, pts bedside nurse notified.     Gissell Dumont, RN, BSN  Transitional Care Coordinator   Office: [phone]  Secure chat via [employer]

## 2025-03-13 NOTE — PROGRESS NOTES
Occupational Therapy    Evaluation    Patient Name: Cale Armenta  MRN: [id number]  Today's Date: 3/13/2025  Time Calculation  Start Time: 1035  Stop Time: 1052  Time Calculation (min): 17 min    Assessment  IP OT Assessment  OT Assessment: Pt presents with R distal UE numbness and L digits 4 and 5 numbness, presented with B UE ataxia R more noticeable than the L. Pt completed bed mobility with Mod I from and elevated surface, able to complete transfers with SBA and fair dynamic standing balance using a cane.  Prognosis: Good  Barriers to Discharge Home: No anticipated barriers  Evaluation/Treatment Tolerance: Patient tolerated treatment well  Medical Staff Made Aware: Yes  End of Session Communication: Physician  End of Session Patient Position: Bed, 3 rail up, Alarm on  Plan:  Treatment Interventions: ADL retraining, Functional transfer training, Endurance training  OT Frequency: 3 times per week  OT Discharge Recommendations: Low intensity level of continued care  OT Recommended Transfer Status: Stand by assist  OT - OK to Discharge: Yes    Subjective   Current Problem:  1. MS (multiple sclerosis) (Multi)  ergocalciferol (Vitamin D-2) 1250 mcg (50,000 units) capsule    Referral to Physical Therapy    walker (Ultra-Light Rollator) misc    Referral to Occupational Therapy    CANCELED: Walker rolling        General:  Reason for Referral: Pt admitted for worsening symptoms of Multiple Sclerosis; gait disturbance, visual change, and numbness  Past Medical History Relevant to Rehab: unspecified demyelinating disease (last seen in neurology clinic Aug 2021), bipolar disorder, and PTSD  Prior to Session Communication: Bedside nurse  Patient Position Received: Bed, 3 rail up, Alarm off, not on at start of session  Family/Caregiver Present: No  General Comment: supine with HOB elevated at the start of the session   Precautions:  Hearing/Visual Limitations: Pt denied double vision this date  Medical Precautions: Fall  precautions    Pain:  Pain Assessment  Pain Assessment: 0-10  0-10 (Numeric) Pain Score: 0 - No pain        Objective   Cognition:  Overall Cognitive Status: Within Functional Limits  Arousal/Alertness: Appropriate responses to stimuli  Orientation Level: Oriented X4  Safety/Judgement: Within Functional Limits           Home Living:  Type of Home: Apartment  Lives With:  (Pt lives with his mother who has paranoid schizophrenia and dementia)  Home Adaptive Equipment: Cane  Home Layout: One level  Home Access: Elevator, Stairs to enter without rails  Entrance Stairs-Number of Steps: 1  Bathroom Equipment: Grab bars in shower   Prior Function:  Level of [place]: Independent with ADLs and functional transfers  Receives Help From: Family (Pt reported his mother helps him more than he helps his mother.)  ADL Assistance: Independent  Ambulatory Assistance:  (uses a cane)       ADL:  Eating Assistance: Independent  Grooming Assistance: Independent  Bathing Assistance: Stand by  Bathing Deficit:  (anticipate)  LE Dressing Assistance: Stand by  LE Dressing Deficit: Thread RLE into pants, Thread LLE into pants, Pull up over hips (initiated in sitting and completed in standing.)  Activity Tolerance:  Endurance: Endurance does not limit participation in activity  Balance:  Dynamic Standing Balance  Dynamic Standing-Level of Assistance: Close supervision  Dynamic Standing-Balance:  (using a cane to ambulate short distance in the room with fair dynamic standing balance.)  Bed Mobility/Transfers: Bed Mobility  Bed Mobility: Yes  Bed Mobility 1  Bed Mobility 1: Supine to sitting  Level of Assistance 1: Modified independent  Bed Mobility Comments 1: HOB elevated   and Transfers  Transfer: Yes  Transfer 1  Transfer From 1: Bed to  Transfer to 1: Stand  Technique 1: Sit to stand  Transfer Device 1:  (initially used a walker however, pt prefers to use his cane.)  Transfer Level of Assistance 1: Close supervision       Vision:      and Vision - Complex Assessment  Ocular Range of Motion: Within Functional Limits  Sensation:  Light Touch:  (Pt reported R hand numbness except his R thumb. Also reported L digits 4 and 5 numbness)       Perception:  Inattention/Neglect: Appears intact  Coordination:  Movements are Fluid and Coordinated: Yes  Finger to Target:  (pt presented with ataxia R more noticeable than the L)   Hand Function:  Hand Function  Gross Grasp: Functional  Coordination: Functional  Extremities: RUE   RUE : Within Functional Limits, LUE   LUE: Within Functional Limits,       Outcome Measures: [facility] Daily Activity  Putting on and taking off regular lower body clothing: A little  Bathing (including washing, rinsing, drying): A little  Putting on and taking off regular upper body clothing: A little  Toileting, which includes using toilet, bedpan or urinal: A little  Taking care of personal grooming such as brushing teeth: None  Eating Meals: None  Daily Activity - Total Score: 20         ,     OT Adult Other Outcome Measures  4AT: negative    Education Documentation  Body Mechanics, taught by Jesús Schwartz OT at 3/13/2025  2:01 PM.  Learner: Patient  Readiness: Acceptance  Method: Explanation  Response: Verbalizes Understanding    ADL Training, taught by Jesús Schwartz OT at 3/13/2025  2:01 PM.  Learner: Patient  Readiness: Acceptance  Method: Explanation  Response: Verbalizes Understanding    Education Comments  No comments found.        Goals:   Encounter Problems       Encounter Problems (Active)       ADLs       Patient will perform UB and LB bathing  with modified independent level of assistance and grab bars. (Progressing)       Start:  03/13/25    Expected End:  04/03/25            Patient with complete lower body dressing with modified independent level of assistance donning and doffing all LE clothes  with PRN adaptive equipment while supported sitting (Progressing)       Start:  03/13/25    Expected End:  04/03/25             Patient will complete toileting including hygiene clothing management/hygiene with modified independent level of assistance and grab bars. (Progressing)       Start:  03/13/25    Expected End:  04/03/25               MOBILITY       Patient will perform Functional mobility min Household distances/Community Distances with modified independent level of assistance and least restrictive device in order to improve safety and functional mobility. (Progressing)       Start:  03/13/25    Expected End:  04/03/25               TRANSFERS                03/13/25 at 2:03 PM   Jesús Schwartz OTR/OTD  Rehab Office: [phone]

## 2025-03-13 NOTE — PROGRESS NOTES
Pharmacy Medication History Review    Cale Armenta is a 39 y.o. male admitted for MS (multiple sclerosis) (Multi). Pharmacy reviewed the patient's [id number] medications and allergies for accuracy.    The list below reflects the updated PTA list.   None        The list below reflects the updated allergy list. Please review each documented allergy for additional clarification and justification.  Allergies  Reviewed by Дмитрий Flor PharmD on 3/13/2025   No Known Allergies         Patient accepts M2B at discharge.     Sources used to complete the med history include:    [facility]  Pharmacy dispense history  Patient interview Good historian  Chart Review  Care Everywhere     Below are additional concerns with the patient's PTA list.  Patient is not currently on any prescribed maintenance medications or over-the-counter supplements.     Medications ADDED:  NA  Medications CHANGED:  NA  Medications REMOVED:   NIKHIL Flor PharmD  Transitions of Care Pharmacist  [facility]  Please reach out via Secure Chat for questions, or if no response call [facility] or [facility]

## 2025-03-13 NOTE — DISCHARGE SUMMARY
Discharge Diagnosis  MS (multiple sclerosis) (Multi)    Issues Requiring Follow-Up  - Follow-up pre-DMT labs (below)  - Follow-up with Dr. Justice on 3/18/25 at 8:30 am     Test Results Pending At Discharge  Pending Labs       Order Current Status    B Cell Phenotyping, Extended In process    B Cell Phenotyping, Extended, Panel In process    CHELLE Virus Antibody with Reflex To Inhibition Assay In process    Sars-Cov-2 Santi Antibody IgG In process    T-Spot TB In process    Urine Culture In process            Hospital Course  Cale Armenta is a 38 YO RH W M last seen in neurology clinic in August 2021 for RIS who has a PMHx of bipolar disorder (unclear if type I or type II), and PTSD presenting to the ED for re-initiation of neurologic care after noting worsening neurologic symptoms over the last year.      On presentation 3/12, patient reports worsening neurologic symptoms including difficulties with gait, vision changes in the right eye, and sensory changes on the right side, none of which are acutely new but have been slowly progressive over the past year. 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 MRI brain, c/t spine then showed multiple new lesions but none enhancing. Patient was given the option to start a short course of high dose steroids while inpatient to potentially address any low level of inflammation present but he politely declined. Pre-DMT lab work sent.     PT/OT recommended home rehab. Referral for outpatient PT placed and rollator ordered for patient prior to discharge. Vitamin D delivered to patient prior to discharge as well. Neuroimmunology pharmacist was also contacted prior to discharge and confirmed that he will reach out to the patient after discharge to set up Ocrevus infusions. Patient was discharged to home on 3/13/25 with close follow-up with Dr. Justice scheduled for 3/18/25.     Pertinent Physical Exam At Time of  Discharge  Physical Exam    MENTAL STATUS:  General appearance: Thin adult male  Orientation: Oriented to person, place, time.  Language: Expression, naming intact  Concentration: Intact  Fund of knowledge: Appropriate  Judgment and Insight: Intact     CRANIAL NERVES:  - II/III: PERRL  - II: Vision blurred in right eye as compared to left, but bilateral visual fields intact to finger count when tested together  - III, IV, VI: EOM full to pursuit without nystagmus  - V: V1-V3 sensation intact bilaterally  - VII: Face muscles symmetric with smile and eye closure  - VIII: Intact to interview  - IX, X: Palate elevated symmetrically bilaterally, no hoarseness  - XI: 5/5 strength on shoulder shrugging bilaterally  - XII: Tongue midline without atrophy or fasciculation     MOTOR: Increased b/l UE tone, b/l LE spasticity      STRENGTH:      R L  Deltoid             5          5  Biceps              5          5  Triceps             5          5                    5          5     Hip flexion       4 4+  Quadriceps      5          5  Hamstrings      5          5  DorsiFlex          4+       5  PlantarFlex       5          5     REFLEXES:        R L  Biceps              3          3  Triceps             3          3  Patellar             3         3  Achilles            4 (4-5 beats clonus)     4 (3-4 beats clonus)     COORDINATION: Ataxia on FTN L>R as well as HTS L>R  SENSORY: Intact to light touch in bl UE and LE but reports numbness in all limbs as well as trunk in band-like distribution, numbness worse on R hemibody compared to L  GAIT: Not assessed this AM    Home Medications     Medication List      START taking these medications     Ultra-Light Rollator misc; Generic drug: walker; 1 each once daily.   Vitamin D2 1,250 mcg (50,000 unit) capsule; Generic drug:   ergocalciferol; Take 1 capsule (1,250 mcg) by mouth 1 (one) time per week.       Outpatient Follow-Up  Future Appointments   Date Time Provider Department  [place]   3/18/2025  8:30 AM Chester Justice MD [id number] [facility] MD [place]  Neurology PGY-2

## 2025-03-13 NOTE — CARE PLAN
The patient's goals for the shift include pt will remain safe and utilize call light -met    The clinical goals for the shift include pt will not have decline in neurological status -met

## 2025-03-13 NOTE — PROGRESS NOTES
"Cale Armenta is a 39 y.o. male on day 1 of admission presenting with MS (multiple sclerosis) (Multi).      Subjective   No acute events overnight. MRI brain as well as cervical and thoracic spine show several new lesions without enhancement. Patient denies new or worsening symptoms. He has been able to ambulate to and from the restroom in his room using his cane without issues.       Objective     Last Recorded Vitals  Blood pressure 116/81, pulse 61, temperature 35.8 °C (96.4 °F), temperature source Temporal, resp. rate 18, height 1.727 m (5' 8\"), weight 59 kg (130 lb), SpO2 98%.    Physical Exam  Neurological Exam    MENTAL STATUS:  General appearance: Thin adult male  Orientation: Oriented to person, place, time.  Language: Expression, naming intact  Concentration: Intact  Fund of knowledge: Appropriate  Judgment and Insight: Intact     CRANIAL NERVES:  - II/III: PERRL  - II: Vision blurred in right eye as compared to left, but bilateral visual fields intact to finger count when tested together  - III, IV, VI: EOM full to pursuit without nystagmus  - V: V1-V3 sensation intact bilaterally  - VII: Face muscles symmetric with smile and eye closure  - VIII: Intact to interview  - IX, X: Palate elevated symmetrically bilaterally, no hoarseness  - XI: 5/5 strength on shoulder shrugging bilaterally  - XII: Tongue midline without atrophy or fasciculation     MOTOR: Increased b/l UE tone, b/l LE spasticity      STRENGTH:      R L  Deltoid             5          5  Biceps              5          5  Triceps             5          5                    5          5     Hip flexion 4 4+  Quadriceps 5 5  Hamstrings 5 5  DorsiFlex          4+       5  PlantarFlex 5 5     REFLEXES:        R L  Biceps              3          3  Triceps             3          3  Patellar             3         3  Achilles            4 (4-5 beats clonus)          4 (3-4 beats clonus)     COORDINATION: Ataxia on FTN L>R as well as HTS " L>R  SENSORY: Intact to light touch in bl UE and LE but reports numbness in all limbs as well as trunk in band-like distribution, numbness worse on R hemibody compared to L  GAIT: Not assessed this AM      Relevant Results  Results for orders placed or performed during the hospital encounter of 03/12/25 (from the past 24 hours)   CBC and Auto Differential   Result Value Ref Range    WBC 5.4 4.4 - 11.3 x10*3/uL    nRBC 0.0 0.0 - 0.0 /100 WBCs    RBC 5.19 4.50 - 5.90 x10*6/uL    Hemoglobin 16.2 13.5 - 17.5 g/dL    Hematocrit 44.1 41.0 - 52.0 %    MCV 85 80 - 100 fL    MCH 31.2 26.0 - 34.0 pg    MCHC 36.7 (H) 32.0 - 36.0 g/dL    RDW 11.7 11.5 - 14.5 %    Platelets 221 150 - 450 x10*3/uL    Neutrophils % 71.6 40.0 - 80.0 %    Immature Granulocytes %, Automated 0.4 0.0 - 0.9 %    Lymphocytes % 18.9 13.0 - 44.0 %    Monocytes % 7.8 2.0 - 10.0 %    Eosinophils % 0.6 0.0 - 6.0 %    Basophils % 0.7 0.0 - 2.0 %    Neutrophils Absolute 3.86 1.20 - 7.70 x10*3/uL    Immature Granulocytes Absolute, Automated 0.02 0.00 - 0.70 x10*3/uL    Lymphocytes Absolute 1.02 (L) 1.20 - 4.80 x10*3/uL    Monocytes Absolute 0.42 0.10 - 1.00 x10*3/uL    Eosinophils Absolute 0.03 0.00 - 0.70 x10*3/uL    Basophils Absolute 0.04 0.00 - 0.10 x10*3/uL   Comprehensive metabolic panel   Result Value Ref Range    Glucose 96 74 - 99 mg/dL    Sodium 139 136 - 145 mmol/L    Potassium 3.7 3.5 - 5.3 mmol/L    Chloride 106 98 - 107 mmol/L    Bicarbonate 26 21 - 32 mmol/L    Anion Gap 11 10 - 20 mmol/L    Urea Nitrogen 23 6 - 23 mg/dL    Creatinine 0.88 0.50 - 1.30 mg/dL    eGFR >90 >60 mL/min/1.73m*2    Calcium 9.7 8.6 - 10.6 mg/dL    Albumin 5.1 (H) 3.4 - 5.0 g/dL    Alkaline Phosphatase 55 33 - 120 U/L    Total Protein 7.7 6.4 - 8.2 g/dL    AST 11 9 - 39 U/L    Bilirubin, Total 1.0 0.0 - 1.2 mg/dL    ALT 6 (L) 10 - 52 U/L   Urinalysis with Reflex Culture and Microscopic   Result Value Ref Range    Color, Urine Yellow Light-Yellow, Yellow, Dark-Yellow     Appearance, Urine Turbid (N) Clear    Specific Gravity, Urine 1.033 1.005 - 1.035    pH, Urine 6.0 5.0, 5.5, 6.0, 6.5, 7.0, 7.5, 8.0    Protein, Urine 20 (TRACE) NEGATIVE, 10 (TRACE), 20 (TRACE) mg/dL    Glucose, Urine Normal Normal mg/dL    Blood, Urine NEGATIVE NEGATIVE mg/dL    Ketones, Urine NEGATIVE NEGATIVE mg/dL    Bilirubin, Urine NEGATIVE NEGATIVE mg/dL    Urobilinogen, Urine 3 (1+) (A) Normal mg/dL    Nitrite, Urine NEGATIVE NEGATIVE    Leukocyte Esterase, Urine 25 Sheila/uL (A) NEGATIVE   Extra Urine Gray Tube   Result Value Ref Range    Extra Tube Hold for add-ons.    Microscopic Only, Urine   Result Value Ref Range    WBC, Urine 1-5 1-5, NONE /HPF    RBC, Urine 1-2 NONE, 1-2, 3-5 /HPF    Squamous Epithelial Cells, Urine 1-9 (SPARSE) Reference range not established. /HPF   Hepatitis B core antibody, total   Result Value Ref Range    Hepatitis B Core AB- Total Nonreactive Nonreactive   Hepatitis B surface antibody   Result Value Ref Range    Hepatitis B Surface AB <3.1 <10.0 mIU/mL   Hepatitis B surface antigen   Result Value Ref Range    Hepatitis B Surface AG Nonreactive Nonreactive   Hepatitis C antibody   Result Value Ref Range    Hepatitis C AB Nonreactive Nonreactive   Varicella zoster antibody, IgG   Result Value Ref Range    Varicella Zoster, IgG Equivocal (A) Negative    Varicella Zoster, IgG Index 0.9 (H) <=0.8 IA   HIV 1/2 Antigen/Antibody Screen with Reflex to Confirmation   Result Value Ref Range    HIV 1/2 Antigen/Antibody Screen with Reflex to Confirmation Nonreactive Nonreactive   IgG, IgA, IgM   Result Value Ref Range    IgG 753 700 - 1,600 mg/dL    IgA 180 70 - 400 mg/dL    IgM 155 40 - 230 mg/dL   CBC and Auto Differential   Result Value Ref Range    WBC 5.5 4.4 - 11.3 x10*3/uL    nRBC 0.0 0.0 - 0.0 /100 WBCs    RBC 4.71 4.50 - 5.90 x10*6/uL    Hemoglobin 15.0 13.5 - 17.5 g/dL    Hematocrit 40.6 (L) 41.0 - 52.0 %    MCV 86 80 - 100 fL    MCH 31.8 26.0 - 34.0 pg    MCHC 36.9 (H) 32.0 - 36.0  g/dL    RDW 11.6 11.5 - 14.5 %    Platelets 188 150 - 450 x10*3/uL    Neutrophils % 64.5 40.0 - 80.0 %    Immature Granulocytes %, Automated 0.4 0.0 - 0.9 %    Lymphocytes % 25.9 13.0 - 44.0 %    Monocytes % 8.2 2.0 - 10.0 %    Eosinophils % 0.5 0.0 - 6.0 %    Basophils % 0.5 0.0 - 2.0 %    Neutrophils Absolute 3.54 1.20 - 7.70 x10*3/uL    Immature Granulocytes Absolute, Automated 0.02 0.00 - 0.70 x10*3/uL    Lymphocytes Absolute 1.42 1.20 - 4.80 x10*3/uL    Monocytes Absolute 0.45 0.10 - 1.00 x10*3/uL    Eosinophils Absolute 0.03 0.00 - 0.70 x10*3/uL    Basophils Absolute 0.03 0.00 - 0.10 x10*3/uL   PST Top   Result Value Ref Range    Extra Tube Hold for add-ons.               [place] Coma Scale  Best Eye Response: Spontaneous  Best Verbal Response: Oriented  Best Motor Response: Follows commands  [place] Coma Scale Score: 15                  MR brain w and wo IV contrast    Result Date: 3/12/2025  STUDY: MR CERVICAL SPINE W AND WO IV CONTRAST; MR THORACIC SPINE W AND WO IV CONTRAST; MR BRAIN W AND WO IV CONTRAST;  3/12/2025 9:07 pm; 3/12/2025 9:18 pm; 3/12/2025 9:27 pm   INDICATION: Signs/Symptoms:Known RIS with cord lesions; concern for SPMS; Signs/Symptoms:Known RIS with cord lesions; conern for SPMS; Signs/Symptoms:Known RIS; now concerned for SPMS with new lesions; last MRI 2021.   COMPARISON: MRI brain 06/19/2021. MRI thoracic and cervical spine 08/08/2020.   ACCESSION NUMBER(S): [id number]; [id number]; [id number]   ORDERING CLINICIAN: YANN GARDNER   TECHNIQUE: MS protocol multiplanar multisequence MR imaging was performed through the brain prior to and following administration of intravenous Dotarem gadolinium based contrast.   Multiplanar multisequence MR imaging was performed through the cervical and thoracic spine prior to and following the administration of intravenous contrast. Noncontrast sagittal T1, T2, STIR, axial T1 and axial T2 weighted images were acquired through the cervical and thoracic  spine. Postcontrast sagittal and axial imaging obtained.   Total contrast dose: 12 ML Dotarem.   FINDINGS: MRI BRAIN:   Multifocal FLAIR hyperintense signal within the subcortical and periventricular white matter. Similar to prior exam, several of the periventricular lesions demonstrate perpendicular orientation in relation to the lateral ventricles. There are additional FLAIR hyperintense foci involving the right basal ganglia, left thalamus, radha, and bilateral cerebellar hemispheres. In the interval since 06/21/2021, there has been development of multiple additional new FLAIR hyperintense lesions, for example involving the parasagittal frontoparietal lobes bilaterally (series 3, image 7, image 10) and periventricular white matter (series 3, image 17), adjacent to the left aspect of the splenium of the corpus callosum (series 3, image 19), right midbrain (series 3, image 23), right radha (series 3, image 27) and right medulla (series 3, image 33). These lesions are predominantly T1 hypointense. There is no significant mass effect or midline shift.   The new lesion within the deep white matter of the left frontoparietal lobe (series 3, image 14) demonstrates a FLAIR hyperintense ring-like signal with internal FLAIR hypointense signal, however does not demonstrate a ring of enhancement. The new lesion in the right radha demonstrates a subtle component of questionable diffusion restriction with intermediate to high DWI signal and intermediate to low ADC signal, however does not demonstrate definitive enhancement. The remainder of the these lesions do not demonstrate any diffusion restriction or post-contrast enhancement.   The previously described hyperintense FLAIR signal along the atrium of the bilateral lateral ventricles has increased in size with increased prominence of internal FLAIR hypointense signal, which may represent a component of cystic encephalomalacia or atrophy. There is minimal associated ex vacuo  dilatation of the bilateral lateral ventricles. There is otherwise a stable component of background mild general parenchyma as volume loss. The basal cisterns are patent.   There are no other areas of diffusion restriction abnormality to suggest acute infarct. No evidence of recent intracranial hemorrhage. No hemosiderin staining is present. There is no abnormal parenchymal or leptomeningeal enhancement.   Although this exam is not optimized for evaluation of the orbits, there is no significant abnormality.   Mucous retention cyst versus polyp within the left maxillary sinus. Scattered mucosal thickening throughout the paranasal sinuses. The mastoid air cells are clear.   There is no abnormal osseous enhancement.   MRI CERVICAL SPINE:   Alignment: The vertebral alignment is within normal limits.   Vertebrae/Intervertebral Discs: The vertebral bodies demonstrate expected height. The marrow signal is within normal limits. Mild multilevel loss of disc height. There is multilevel disc desiccation from the levels of C2-C3 through C5-C6.   Cord: Interval increase in size of the previously described T2 hyperintensity at the C5-C6 level, now extending from the level of C4-C5 to C5-C6 and measuring 2.3 cm in craniocaudal dimension (prior: 1.5 cm). There is associated hyperintense STIR signal in this area consistent with edema. Additionally, there is a T2 hyperintense lesion at the level of C3-C4 measuring about 1.7 in the craniocaudal dimension, also with associated hyperintense STIR signal consistent with edema, which was not definitively seen on prior exam.   C1-C2: The cervicomedullary junction appears unremarkable. There is no spinal canal stenosis.   C2-C3: There is no posterior disc contour abnormality. There is no significant spinal canal or neural foraminal stenosis.   C3-C4: Small posterior disc bulge. Mild ventral effacement of the thecal sac, without overt spinal canal stenosis. There is no significant neural  foraminal stenosis.   C4-C5: Small posterior disc bulge. Mild ventral effacement of the thecal sac, without overt spinal canal stenosis. There is no significant neural foraminal stenosis.   C5-C6: There is no posterior disc contour abnormality. There is no significant spinal canal or neural foraminal stenosis.   C6-C7: Small posterior disc bulge. Mild ventral effacement of the thecal sac, without overt spinal canal stenosis. There is no significant neural foraminal stenosis.   C7-T1: There is no posterior disc contour abnormality.  There is no significant spinal canal or neural foraminal stenosis.   The prevertebral and posterior paraspinous soft tissues are within normal limits.   MRI THORACIC SPINE:   Alignment: Within normal limits.   Vertebrae/Intervertebral Discs: The vertebral body heights are intact. There are T1 hyperintense lesions with internal speckled T1 hypointense signal within the T2, T4 and T8 vertebral bodies, favored to represent intravertebral hemangiomas. The marrow signal is otherwise within normal limits. The disc spaces are preserved.   Cord: Interval increase in size of the previously described T2 hyperintense/STIR hyperintense signal within the cord at the T4-T5 level, now extending up to the level of T3. There is no associated enhancement with the signal. Additionally, there has been interval development of a T2 hyperintense/STIR hyperintense signal within the cord from the levels of T6 through T8. There is no associated enhancement with the signal. Similar T2 hyperintense/STIR hyperintense signal within the cord at the level of T1-T2 without associated enhancement.   T1-2: There is no significant spinal canal stenosis. T2-3: There is no significant spinal canal stenosis. T3-4: There is no significant spinal canal stenosis. T4-5: There is no significant spinal canal stenosis. T5-6: There is no significant spinal canal stenosis. T6-7: Similar focal central disc herniation, with mild ventral  effacement of the thecal sac without overt spinal canal stenosis. T7-8: Similar focal central disc herniation, with mild ventral effacement of the thecal sac without spinal canal stenosis. T8-9: There is no significant spinal canal stenosis. T9-10: There is no significant spinal canal stenosis. T10-11: There is no significant spinal canal stenosis. T11-12: There is no significant spinal canal stenosis.   Paraspinous Soft Tissues: Within normal limits.       MRI BRAIN:   1. Redemonstration of multifocal areas of hyperintense FLAIR signal within the subcortical and periventricular white matter, many of which demonstrate a perpendicular orientation to the lateral ventricles and are favored to represent demyelinating plaques. In the interval since 06/21/2021, there has been development of multiple new additional lesions throughout the cerebral hemispheres and brainstem. 2. The new lesion in the right radha demonstrates a subtle component of questionable diffusion restriction, however no postcontrast enhancement. Unable to exclude a component of active demyelination. None of the remaining lesions demonstrate diffusion restriction or postcontrast enhancement to suggest active demyelination. 3. The new lesion in the deep white matter of the left frontoparietal lobe demonstrates ring-like FLAIR hyperintense FLAIR hypointense signal, however does not demonstrate a rim of enhancement. Although unlikely given the lack of additional findings to support the diagnosis, recommend continued attention on follow-up to exclude Balï¿½ concentric sclerosis. 4. There has been probable interval development of cystic encephalomalacia/atrophy along the FLAIR hyperintense signal along the atrium of the bilateral lateral ventricles. There is a minimal component of ex vacuo dilatation of the bilateral lateral ventricles.     MRI CERVICAL SPINE:   1. Interval development of a demyelinating plaque within the cord at the level of C3-C4.  Additionally, there has been interval increase in size of the previously described demyelinating plaque within the cord at the C5-C6 level, now extending from the levels of C4 through C6. There is associated edema of the cord at these levels, however no enhancement to suggest active demyelination.   MRI THORACIC SPINE: 1. Interval increase in size of the previously described demyelinating plaque within the cord T4-T5 level, now extending from T3-T5. Interval development of a demyelinating plaque within the cord from the levels of T6 through T8. There is associated edema of the cord at these levels, however no enhancement to suggest active demyelination. 2. Similar demyelinating plaque and edema within the cord of the level of T1-T2.   I personally reviewed the images/study and I agree with the findings as stated by Archie Moore MD. This study was interpreted at [facility], [place], [place]   MACRO: None     Dictation workstation:   [id number]    MR cervical spine w and wo IV contrast    Result Date: 3/12/2025  STUDY: MR CERVICAL SPINE W AND WO IV CONTRAST; MR THORACIC SPINE W AND WO IV CONTRAST; MR BRAIN W AND WO IV CONTRAST;  3/12/2025 9:07 pm; 3/12/2025 9:18 pm; 3/12/2025 9:27 pm   INDICATION: Signs/Symptoms:Known RIS with cord lesions; concern for SPMS; Signs/Symptoms:Known RIS with cord lesions; conern for SPMS; Signs/Symptoms:Known RIS; now concerned for SPMS with new lesions; last MRI 2021.   COMPARISON: MRI brain 06/19/2021. MRI thoracic and cervical spine 08/08/2020.   ACCESSION NUMBER(S): [id number]; [id number]; [id number]   ORDERING CLINICIAN: YANN GARDNER   TECHNIQUE: MS protocol multiplanar multisequence MR imaging was performed through the brain prior to and following administration of intravenous Dotarem gadolinium based contrast.   Multiplanar multisequence MR imaging was performed through the cervical and thoracic spine prior to and following the  administration of intravenous contrast. Noncontrast sagittal T1, T2, STIR, axial T1 and axial T2 weighted images were acquired through the cervical and thoracic spine. Postcontrast sagittal and axial imaging obtained.   Total contrast dose: 12 ML Dotarem.   FINDINGS: MRI BRAIN:   Multifocal FLAIR hyperintense signal within the subcortical and periventricular white matter. Similar to prior exam, several of the periventricular lesions demonstrate perpendicular orientation in relation to the lateral ventricles. There are additional FLAIR hyperintense foci involving the right basal ganglia, left thalamus, radha, and bilateral cerebellar hemispheres. In the interval since 06/21/2021, there has been development of multiple additional new FLAIR hyperintense lesions, for example involving the parasagittal frontoparietal lobes bilaterally (series 3, image 7, image 10) and periventricular white matter (series 3, image 17), adjacent to the left aspect of the splenium of the corpus callosum (series 3, image 19), right midbrain (series 3, image 23), right radha (series 3, image 27) and right medulla (series 3, image 33). These lesions are predominantly T1 hypointense. There is no significant mass effect or midline shift.   The new lesion within the deep white matter of the left frontoparietal lobe (series 3, image 14) demonstrates a FLAIR hyperintense ring-like signal with internal FLAIR hypointense signal, however does not demonstrate a ring of enhancement. The new lesion in the right radha demonstrates a subtle component of questionable diffusion restriction with intermediate to high DWI signal and intermediate to low ADC signal, however does not demonstrate definitive enhancement. The remainder of the these lesions do not demonstrate any diffusion restriction or post-contrast enhancement.   The previously described hyperintense FLAIR signal along the atrium of the bilateral lateral ventricles has increased in size with increased  prominence of internal FLAIR hypointense signal, which may represent a component of cystic encephalomalacia or atrophy. There is minimal associated ex vacuo dilatation of the bilateral lateral ventricles. There is otherwise a stable component of background mild general parenchyma as volume loss. The basal cisterns are patent.   There are no other areas of diffusion restriction abnormality to suggest acute infarct. No evidence of recent intracranial hemorrhage. No hemosiderin staining is present. There is no abnormal parenchymal or leptomeningeal enhancement.   Although this exam is not optimized for evaluation of the orbits, there is no significant abnormality.   Mucous retention cyst versus polyp within the left maxillary sinus. Scattered mucosal thickening throughout the paranasal sinuses. The mastoid air cells are clear.   There is no abnormal osseous enhancement.   MRI CERVICAL SPINE:   Alignment: The vertebral alignment is within normal limits.   Vertebrae/Intervertebral Discs: The vertebral bodies demonstrate expected height. The marrow signal is within normal limits. Mild multilevel loss of disc height. There is multilevel disc desiccation from the levels of C2-C3 through C5-C6.   Cord: Interval increase in size of the previously described T2 hyperintensity at the C5-C6 level, now extending from the level of C4-C5 to C5-C6 and measuring 2.3 cm in craniocaudal dimension (prior: 1.5 cm). There is associated hyperintense STIR signal in this area consistent with edema. Additionally, there is a T2 hyperintense lesion at the level of C3-C4 measuring about 1.7 in the craniocaudal dimension, also with associated hyperintense STIR signal consistent with edema, which was not definitively seen on prior exam.   C1-C2: The cervicomedullary junction appears unremarkable. There is no spinal canal stenosis.   C2-C3: There is no posterior disc contour abnormality. There is no significant spinal canal or neural foraminal  stenosis.   C3-C4: Small posterior disc bulge. Mild ventral effacement of the thecal sac, without overt spinal canal stenosis. There is no significant neural foraminal stenosis.   C4-C5: Small posterior disc bulge. Mild ventral effacement of the thecal sac, without overt spinal canal stenosis. There is no significant neural foraminal stenosis.   C5-C6: There is no posterior disc contour abnormality. There is no significant spinal canal or neural foraminal stenosis.   C6-C7: Small posterior disc bulge. Mild ventral effacement of the thecal sac, without overt spinal canal stenosis. There is no significant neural foraminal stenosis.   C7-T1: There is no posterior disc contour abnormality.  There is no significant spinal canal or neural foraminal stenosis.   The prevertebral and posterior paraspinous soft tissues are within normal limits.   MRI THORACIC SPINE:   Alignment: Within normal limits.   Vertebrae/Intervertebral Discs: The vertebral body heights are intact. There are T1 hyperintense lesions with internal speckled T1 hypointense signal within the T2, T4 and T8 vertebral bodies, favored to represent intravertebral hemangiomas. The marrow signal is otherwise within normal limits. The disc spaces are preserved.   Cord: Interval increase in size of the previously described T2 hyperintense/STIR hyperintense signal within the cord at the T4-T5 level, now extending up to the level of T3. There is no associated enhancement with the signal. Additionally, there has been interval development of a T2 hyperintense/STIR hyperintense signal within the cord from the levels of T6 through T8. There is no associated enhancement with the signal. Similar T2 hyperintense/STIR hyperintense signal within the cord at the level of T1-T2 without associated enhancement.   T1-2: There is no significant spinal canal stenosis. T2-3: There is no significant spinal canal stenosis. T3-4: There is no significant spinal canal stenosis. T4-5: There  is no significant spinal canal stenosis. T5-6: There is no significant spinal canal stenosis. T6-7: Similar focal central disc herniation, with mild ventral effacement of the thecal sac without overt spinal canal stenosis. T7-8: Similar focal central disc herniation, with mild ventral effacement of the thecal sac without spinal canal stenosis. T8-9: There is no significant spinal canal stenosis. T9-10: There is no significant spinal canal stenosis. T10-11: There is no significant spinal canal stenosis. T11-12: There is no significant spinal canal stenosis.   Paraspinous Soft Tissues: Within normal limits.       MRI BRAIN:   1. Redemonstration of multifocal areas of hyperintense FLAIR signal within the subcortical and periventricular white matter, many of which demonstrate a perpendicular orientation to the lateral ventricles and are favored to represent demyelinating plaques. In the interval since 06/21/2021, there has been development of multiple new additional lesions throughout the cerebral hemispheres and brainstem. 2. The new lesion in the right radha demonstrates a subtle component of questionable diffusion restriction, however no postcontrast enhancement. Unable to exclude a component of active demyelination. None of the remaining lesions demonstrate diffusion restriction or postcontrast enhancement to suggest active demyelination. 3. The new lesion in the deep white matter of the left frontoparietal lobe demonstrates ring-like FLAIR hyperintense FLAIR hypointense signal, however does not demonstrate a rim of enhancement. Although unlikely given the lack of additional findings to support the diagnosis, recommend continued attention on follow-up to exclude Balï¿½ concentric sclerosis. 4. There has been probable interval development of cystic encephalomalacia/atrophy along the FLAIR hyperintense signal along the atrium of the bilateral lateral ventricles. There is a minimal component of ex vacuo dilatation of  the bilateral lateral ventricles.     MRI CERVICAL SPINE:   1. Interval development of a demyelinating plaque within the cord at the level of C3-C4. Additionally, there has been interval increase in size of the previously described demyelinating plaque within the cord at the C5-C6 level, now extending from the levels of C4 through C6. There is associated edema of the cord at these levels, however no enhancement to suggest active demyelination.   MRI THORACIC SPINE: 1. Interval increase in size of the previously described demyelinating plaque within the cord T4-T5 level, now extending from T3-T5. Interval development of a demyelinating plaque within the cord from the levels of T6 through T8. There is associated edema of the cord at these levels, however no enhancement to suggest active demyelination. 2. Similar demyelinating plaque and edema within the cord of the level of T1-T2.   I personally reviewed the images/study and I agree with the findings as stated by Archie Moore MD. This study was interpreted at [facility], [place], [place]   MACRO: None     Dictation workstation:   [id number]    MR thoracic spine w and wo IV contrast    Result Date: 3/12/2025  STUDY: MR CERVICAL SPINE W AND WO IV CONTRAST; MR THORACIC SPINE W AND WO IV CONTRAST; MR BRAIN W AND WO IV CONTRAST;  3/12/2025 9:07 pm; 3/12/2025 9:18 pm; 3/12/2025 9:27 pm   INDICATION: Signs/Symptoms:Known RIS with cord lesions; concern for SPMS; Signs/Symptoms:Known RIS with cord lesions; conern for SPMS; Signs/Symptoms:Known RIS; now concerned for SPMS with new lesions; last MRI 2021.   COMPARISON: MRI brain 06/19/2021. MRI thoracic and cervical spine 08/08/2020.   ACCESSION NUMBER(S): [id number]; [id number]; [id number]   ORDERING CLINICIAN: YANN GARDNER   TECHNIQUE: MS protocol multiplanar multisequence MR imaging was performed through the brain prior to and following administration of intravenous Dotarem  gadolinium based contrast.   Multiplanar multisequence MR imaging was performed through the cervical and thoracic spine prior to and following the administration of intravenous contrast. Noncontrast sagittal T1, T2, STIR, axial T1 and axial T2 weighted images were acquired through the cervical and thoracic spine. Postcontrast sagittal and axial imaging obtained.   Total contrast dose: 12 ML Dotarem.   FINDINGS: MRI BRAIN:   Multifocal FLAIR hyperintense signal within the subcortical and periventricular white matter. Similar to prior exam, several of the periventricular lesions demonstrate perpendicular orientation in relation to the lateral ventricles. There are additional FLAIR hyperintense foci involving the right basal ganglia, left thalamus, radha, and bilateral cerebellar hemispheres. In the interval since 06/21/2021, there has been development of multiple additional new FLAIR hyperintense lesions, for example involving the parasagittal frontoparietal lobes bilaterally (series 3, image 7, image 10) and periventricular white matter (series 3, image 17), adjacent to the left aspect of the splenium of the corpus callosum (series 3, image 19), right midbrain (series 3, image 23), right radha (series 3, image 27) and right medulla (series 3, image 33). These lesions are predominantly T1 hypointense. There is no significant mass effect or midline shift.   The new lesion within the deep white matter of the left frontoparietal lobe (series 3, image 14) demonstrates a FLAIR hyperintense ring-like signal with internal FLAIR hypointense signal, however does not demonstrate a ring of enhancement. The new lesion in the right radha demonstrates a subtle component of questionable diffusion restriction with intermediate to high DWI signal and intermediate to low ADC signal, however does not demonstrate definitive enhancement. The remainder of the these lesions do not demonstrate any diffusion restriction or post-contrast  enhancement.   The previously described hyperintense FLAIR signal along the atrium of the bilateral lateral ventricles has increased in size with increased prominence of internal FLAIR hypointense signal, which may represent a component of cystic encephalomalacia or atrophy. There is minimal associated ex vacuo dilatation of the bilateral lateral ventricles. There is otherwise a stable component of background mild general parenchyma as volume loss. The basal cisterns are patent.   There are no other areas of diffusion restriction abnormality to suggest acute infarct. No evidence of recent intracranial hemorrhage. No hemosiderin staining is present. There is no abnormal parenchymal or leptomeningeal enhancement.   Although this exam is not optimized for evaluation of the orbits, there is no significant abnormality.   Mucous retention cyst versus polyp within the left maxillary sinus. Scattered mucosal thickening throughout the paranasal sinuses. The mastoid air cells are clear.   There is no abnormal osseous enhancement.   MRI CERVICAL SPINE:   Alignment: The vertebral alignment is within normal limits.   Vertebrae/Intervertebral Discs: The vertebral bodies demonstrate expected height. The marrow signal is within normal limits. Mild multilevel loss of disc height. There is multilevel disc desiccation from the levels of C2-C3 through C5-C6.   Cord: Interval increase in size of the previously described T2 hyperintensity at the C5-C6 level, now extending from the level of C4-C5 to C5-C6 and measuring 2.3 cm in craniocaudal dimension (prior: 1.5 cm). There is associated hyperintense STIR signal in this area consistent with edema. Additionally, there is a T2 hyperintense lesion at the level of C3-C4 measuring about 1.7 in the craniocaudal dimension, also with associated hyperintense STIR signal consistent with edema, which was not definitively seen on prior exam.   C1-C2: The cervicomedullary junction appears  unremarkable. There is no spinal canal stenosis.   C2-C3: There is no posterior disc contour abnormality. There is no significant spinal canal or neural foraminal stenosis.   C3-C4: Small posterior disc bulge. Mild ventral effacement of the thecal sac, without overt spinal canal stenosis. There is no significant neural foraminal stenosis.   C4-C5: Small posterior disc bulge. Mild ventral effacement of the thecal sac, without overt spinal canal stenosis. There is no significant neural foraminal stenosis.   C5-C6: There is no posterior disc contour abnormality. There is no significant spinal canal or neural foraminal stenosis.   C6-C7: Small posterior disc bulge. Mild ventral effacement of the thecal sac, without overt spinal canal stenosis. There is no significant neural foraminal stenosis.   C7-T1: There is no posterior disc contour abnormality.  There is no significant spinal canal or neural foraminal stenosis.   The prevertebral and posterior paraspinous soft tissues are within normal limits.   MRI THORACIC SPINE:   Alignment: Within normal limits.   Vertebrae/Intervertebral Discs: The vertebral body heights are intact. There are T1 hyperintense lesions with internal speckled T1 hypointense signal within the T2, T4 and T8 vertebral bodies, favored to represent intravertebral hemangiomas. The marrow signal is otherwise within normal limits. The disc spaces are preserved.   Cord: Interval increase in size of the previously described T2 hyperintense/STIR hyperintense signal within the cord at the T4-T5 level, now extending up to the level of T3. There is no associated enhancement with the signal. Additionally, there has been interval development of a T2 hyperintense/STIR hyperintense signal within the cord from the levels of T6 through T8. There is no associated enhancement with the signal. Similar T2 hyperintense/STIR hyperintense signal within the cord at the level of T1-T2 without associated enhancement.   T1-2:  There is no significant spinal canal stenosis. T2-3: There is no significant spinal canal stenosis. T3-4: There is no significant spinal canal stenosis. T4-5: There is no significant spinal canal stenosis. T5-6: There is no significant spinal canal stenosis. T6-7: Similar focal central disc herniation, with mild ventral effacement of the thecal sac without overt spinal canal stenosis. T7-8: Similar focal central disc herniation, with mild ventral effacement of the thecal sac without spinal canal stenosis. T8-9: There is no significant spinal canal stenosis. T9-10: There is no significant spinal canal stenosis. T10-11: There is no significant spinal canal stenosis. T11-12: There is no significant spinal canal stenosis.   Paraspinous Soft Tissues: Within normal limits.       MRI BRAIN:   1. Redemonstration of multifocal areas of hyperintense FLAIR signal within the subcortical and periventricular white matter, many of which demonstrate a perpendicular orientation to the lateral ventricles and are favored to represent demyelinating plaques. In the interval since 06/21/2021, there has been development of multiple new additional lesions throughout the cerebral hemispheres and brainstem. 2. The new lesion in the right radha demonstrates a subtle component of questionable diffusion restriction, however no postcontrast enhancement. Unable to exclude a component of active demyelination. None of the remaining lesions demonstrate diffusion restriction or postcontrast enhancement to suggest active demyelination. 3. The new lesion in the deep white matter of the left frontoparietal lobe demonstrates ring-like FLAIR hyperintense FLAIR hypointense signal, however does not demonstrate a rim of enhancement. Although unlikely given the lack of additional findings to support the diagnosis, recommend continued attention on follow-up to exclude Balï¿½ concentric sclerosis. 4. There has been probable interval development of cystic  encephalomalacia/atrophy along the FLAIR hyperintense signal along the atrium of the bilateral lateral ventricles. There is a minimal component of ex vacuo dilatation of the bilateral lateral ventricles.     MRI CERVICAL SPINE:   1. Interval development of a demyelinating plaque within the cord at the level of C3-C4. Additionally, there has been interval increase in size of the previously described demyelinating plaque within the cord at the C5-C6 level, now extending from the levels of C4 through C6. There is associated edema of the cord at these levels, however no enhancement to suggest active demyelination.   MRI THORACIC SPINE: 1. Interval increase in size of the previously described demyelinating plaque within the cord T4-T5 level, now extending from T3-T5. Interval development of a demyelinating plaque within the cord from the levels of T6 through T8. There is associated edema of the cord at these levels, however no enhancement to suggest active demyelination. 2. Similar demyelinating plaque and edema within the cord of the level of T1-T2.   I personally reviewed the images/study and I agree with the findings as stated by Archie Moore MD. This study was interpreted at [facility], [place], [place]   MACRO: None     Dictation workstation:   [id number]    CT head wo IV contrast    Result Date: 3/12/2025  Interpreted By:  Peyman Tyson, STUDY: CT HEAD WO IV CONTRAST;  3/12/2025 1:05 pm   INDICATION: Signs/Symptoms:ms exacerbation with worsening gait.     COMPARISON: Report only of MRI brain 06/19/2021.   ACCESSION NUMBER(S): [id number]   ORDERING CLINICIAN: PAULA BOLAÑOS   TECHNIQUE: Noncontrast axial CT scan of head was performed. Angled reformats in brain and bone windows were generated. The images were reviewed in bone, brain, blood and soft tissue windows.   FINDINGS: Periventricular and deep white matter hypodensities bilateral cerebral hemispheres with a parietal  and temporal predominance suggestive of the primary demyelinating disease process. Findings are similar to the MRI from 2021.   No intracranial hemorrhage. No significant intracranial mass effect. No evidence of large evolving cortical infarction.   Paranasal sinuses and mastoid air cells are well aerated.       Periventricular and deep white matter hypodensities bilateral cerebral hemispheres with a parietal and temporal predominance suggestive of the primary demyelinating disease process. The report of the 2021 MRI describes extensive demyelinating disease process. However, those images are not currently available for review.     MACRO: None   Signed by: Peyman Tyson 3/12/2025 1:41 PM Dictation workstation:   [id number]         Assessment/Plan      Assessment & Plan  MS (multiple sclerosis) (Multi)    Cale Armenta is a 40 YO RH W M with last seen in neurology clinic Aug 2021 who has a PMH of bipolar disorder (unclear if type I or type II), and PTSD presenting to ED for re-initiation of neurologic care after noting worsening neurologic symptoms over the last year.      On presentation today, pt reports worsening neurologic symptoms over the past year including difficulties with gait, vision changes in the right eye, and sensory changes on the right side. 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 MRI brain, c/t spine ordered to assess presence of new lesions as well as to establish whether there is active MS flare. Will likely need PMS DMT as outpatient ie) Ocrelizumab.      Plan:     #Likely SPMS  :: MRI brain, C, and T spine with and without contrast w/ several new lesions but no enhancement  - Will discuss initiation of low-dose IVMP with patient on rounds (risks vs. benefits)  - Consider OCT as outpatient given chronic right eye red desaturation  - Obtain following labs for DMT: TB spot (in-process), CHELLE IgG reflex (in process), Hep B/C screens  (non-reactive), VZV IgG (equivocal, high at 0.9), Covid IgG (in process), HIV (negative), and Immunoglobulin levels (normal), and B cell phenotyping (in process)     F: None  E: K > 4, Phos >3, Mg > 2  N: Regular Diet  A: PIV  P: SCDs      Dispo: PT/OT evaluation TBD      Maia Culver MD  Neurology PGY-2

## 2025-03-13 NOTE — HOSPITAL COURSE
Cale Armenta is a 38 YO RH W M last seen in neurology clinic in August 2021 for RIS who has a PMHx of bipolar disorder (unclear if type I or type II), and PTSD presenting to the ED for re-initiation of neurologic care after noting worsening neurologic symptoms over the last year.      On presentation 3/12, patient reports worsening neurologic symptoms including difficulties with gait, vision changes in the right eye, and sensory changes on the right side, none of which are acutely new but have been slowly progressive over the past year. 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 MRI brain, c/t spine then showed multiple new lesions but none enhancing. Patient was given the option to start a short course of high dose steroids while inpatient to potentially address any low level of inflammation present but he politely declined. Pre-DMT lab work sent.     PT/OT recommended home rehab. Referral for outpatient PT placed and rollator ordered for patient prior to discharge. Vitamin D delivered to patient prior to discharge as well. Neuroimmunology pharmacist was also contacted prior to discharge and confirmed that he will reach out to the patient after discharge to set up Ocrevus infusions. Patient was discharged to home on 3/14/25 with close follow-up with Dr. Justice scheduled for 3/18/25.

## 2025-03-13 NOTE — DISCHARGE INSTRUCTIONS
Dear Mr. Armenta,     You were admitted due to worsening gait problems and concern for an acute flare of multiple sclerosis. While admitted, we did an MRI of your brain and cervical and thoracic spine which did not show any contrast-enhancing (active) lesions but did show several older new lesions since your last MRI in 2021. 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     Our pharmacist, Jez, will call you to set up infusions of a medication called Ocrevus. Please keep a look-out for a call from him to set you up. Additionally, we were able to get you an earlier appointment with Dr. Justice on 3/18/25 at 8:30 am. Please make sure to attend this appointment.     We also placed an order for physical therapy. You should receive a call to schedule your PT sessions but if you do not, then please call [phone] to schedule.     Take Care,   Your  Care Team

## 2025-03-13 NOTE — PROGRESS NOTES
Physical Therapy    Physical Therapy Evaluation & Treatment    Patient Name: Cale Armenta  MRN: [id number]  Department: [place] [address]  Room: [facility] [id number]-  Today's Date: 3/13/2025   Time Calculation  Start Time: 1127  Stop Time: 1152  Time Calculation (min): 25 min    Assessment/Plan   PT Assessment  PT Assessment Results: Decreased strength, Decreased endurance, Decreased mobility, Decreased coordination, Impaired balance, Impaired vision  Rehab Prognosis: Good  Barriers to Discharge Home: No anticipated barriers  Evaluation/Treatment Tolerance: Patient tolerated treatment well  Medical Staff Made Aware: Yes  Strengths: Ability to acquire knowledge, Attitude of self, Coping skills, Capable of completing ADLs semi/independent, Insight into problems, Housing layout  End of Session Communication: Bedside nurse  Assessment Comment: Pt is a 38 yo male, typ independent with use of single-point cane, admitted for worsening symptoms of Multiple Sclerosis; gait disturbance, visual change, and numbness. Pt able to ambulate >125ft SBA with AD. Pt currently limited by decreased endurance, strength, and impaired vision. Pt would benefit from continued PT while in hospital to facilitate safe ambulation and maintenance of function.  End of Session Patient Position: Bed, 3 rail up, Alarm on   IP OR SWING BED PT PLAN  Inpatient or Swing Bed: Inpatient  PT Plan  Treatment/Interventions: Gait training, Stair training, Balance training, Strengthening, Endurance training, Therapeutic exercise, Therapeutic activity  PT Plan: Ongoing PT  PT Frequency: 3 times per week  PT Discharge Recommendations: Low intensity level of continued care (Home with home PT)  Equipment Recommended upon Discharge: Other (comment) (Rolator)  PT Recommended Transfer Status: Stand by assist  PT - OK to Discharge: Yes (Meaning pt has been evaluated and d/c reccs have been made.)      Subjective     General Visit Information:  General  Reason for Referral: Pt admitted  for worsening symptoms of Multiple Sclerosis; gait disturbance, visual change, and numbness  Past Medical History Relevant to Rehab: unspecified demyelinating disease (last seen in neurology clinic Aug 2021), bipolar disorder, and PTSD  Family/Caregiver Present: No  Prior to Session Communication: Bedside nurse  Patient Position Received: Bed, 3 rail up, Alarm off, not on at start of session  General Comment: Supine. Pt pleasant, agreeable and cooperative to PT.  Pt able to ambulate today. Tolerated PT well.  Home Living:  Home Living  Type of Home: Apartment  Lives With: Parent(s)  Home Adaptive Equipment: Cane  Home Layout: One level  Home Access: Elevator, Stairs to enter without rails  Entrance Stairs-Number of Steps: 1 MONA  Bathroom Equipment: Grab bars in shower  Home Living Comments: Pt lives with Mother, who has some cognitive deficit  Prior Level of Function:  Prior Function Per Pt/Caregiver Report  Level of [place]: Independent with ADLs and functional transfers  ADL Assistance: Independent  Ambulatory Assistance: Independent  Prior Function Comments: Pt notes that he has used a cane for the last year at least due to progressing MS symptoms  Precautions:  Precautions  Hearing/Visual Limitations: Impaire vision (blurry/double vision R>L)  Medical Precautions: Fall precautions        Objective   Pain:  Pain Assessment  Pain Assessment: 0-10  0-10 (Numeric) Pain Score: 0 - No pain  Pain Type:  (pt does not report any pain)  Cognition:  Cognition  Arousal/Alertness: Appropriate responses to stimuli  Orientation Level: Oriented X4  Following Commands: Follows all commands and directions without difficulty    General Assessments:    Activity Tolerance  Endurance: Endurance does not limit participation in activity    Sensation  Light Touch:  (pt able to feel light touch BUE and BLE)    Strength  Strength Comments: BLE and BUE WFL  Strength  Strength Comments: BLE and BUE WFL    Coordination  Finger to Nose:  Intact (able to comeplete at slower pace and movement is shaky)  Rapid Alternating Movements: Intact  Alternating Toe Taps: Intact  Heel to [place]: Intact (intact, slight difficulty with L heel to R shin)    Postural Control  Postural Control: Within Functional Limits    Static Sitting Balance  Static Sitting-Balance Support: Feet supported  Static Sitting-Level of Assistance: Independent  Dynamic Sitting Balance  Dynamic Sitting-Balance Support: Feet supported  Dynamic Sitting-Level of Assistance: Distant supervision    Static Standing Balance  Static Standing-Balance Support: Bilateral upper extremity supported  Static Standing-Level of Assistance: Distant supervision  Dynamic Standing Balance  Dynamic Standing-Balance Support: Bilateral upper extremity supported  Dynamic Standing-Level of Assistance: Distant supervision  Functional Assessments:    Bed Mobility  Bed Mobility: Yes  Bed Mobility 1  Bed Mobility 1: Supine to sitting, Sitting to supine  Level of Assistance 1: Modified independent    Transfers  Transfer: Yes  Transfer 1  Transfer From 1: Sit to, Stand to  Transfer to 1: Sit  Transfer Device 1: Cane, Walker  Transfer Level of Assistance 1: Close supervision  Trials/Comments 1: x2 trials. cane used for one trial, FWW to use for one trial    Ambulation/Gait Training  Ambulation/Gait Training Performed: Yes  Ambulation/Gait Training 1  Surface 1: Level tile  Device 1: Single point cane  Assistance 1: Close supervision  Quality of Gait 1: Inconsistent stride length, Decreased step length, Wide base of support  Comments/Distance (ft) 1: 75ft  Ambulation/Gait Training 2  Surface 2: Level tile  Device 2: Rolling walker  Assistance 2: Close supervision  Quality of Gait 2: Wide base of support, Decreased step length, Inconsistent stride length  Comments/Distance (ft) 2: 125ft. Seated break prior to ambulation.    Stairs  Stairs: No    Extremity/Trunk Assessments:    RUE   RUE : Within Functional Limits (elbow  flexion 5/5, elbow extension 5/5,  strength 4/5)  LUE   LUE: Within Functional Limits (elbow flexion 5/5, elbow extension 5/5,  strength 4/5)  RLE   RLE : Within Functional Limits  Strength RLE  R Knee Flexion: 5/5  R Knee Extension: 4+/5  R Ankle Dorsiflexion: 4+/5  R Ankle Plantar Flexion: 5/5  LLE   LLE : Within Functional Limits  Strength LLE  L Knee Flexion: 5/5  L Knee Extension: 4+/5  L Ankle Dorsiflexion: 4+/5  L Ankle Plantar Flexion: 5/5  Treatments:  Therapeutic Exercise  Therapeutic Exercise Performed: Yes  Therapeutic Exercise Activity 1: Ambulation with cane and walker.    Bed Mobility  Bed Mobility: Yes  Bed Mobility 1  Bed Mobility 1: Supine to sitting, Sitting to supine  Level of Assistance 1: Modified independent    Ambulation/Gait Training  Ambulation/Gait Training Performed: Yes  Ambulation/Gait Training 1  Surface 1: Level tile  Device 1: Single point cane  Assistance 1: Close supervision  Quality of Gait 1: Inconsistent stride length, Decreased step length, Wide base of support  Comments/Distance (ft) 1: 75ft  Ambulation/Gait Training 2  Surface 2: Level tile  Device 2: Rolling walker  Assistance 2: Close supervision  Quality of Gait 2: Wide base of support, Decreased step length, Inconsistent stride length  Comments/Distance (ft) 2: 125ft. Seated break prior to ambulation.  Transfers  Transfer: Yes  Transfer 1  Transfer From 1: Sit to, Stand to  Transfer to 1: Sit  Transfer Device 1: Cane, Walker  Transfer Level of Assistance 1: Close supervision  Trials/Comments 1: x2 trials. cane used for one trial, FWW to use for one trial    Stairs  Stairs: No    Outcome Measures:    [facility] Basic Mobility  Turning from your back to your side while in a flat bed without using bedrails: None  Moving from lying on your back to sitting on the side of a flat bed without using bedrails: None  Moving to and from bed to chair (including a wheelchair): A little  Standing up from a chair using your arms (e.g.  wheelchair or bedside chair): A little  To walk in hospital room: A little  Climbing 3-5 steps with railing: A little  Basic Mobility - Total Score: 20                                                                                             Encounter Problems       Encounter Problems (Active)       Mobility       STG - Patient will ambulate       Start:  03/13/25               Mobility       Pt will ambulate >150ft, LRD, Ind to facilitate safe navigation of home environment.       Start:  03/13/25    Expected End:  03/27/25            Up/Down 1 step, LRD, Ind to facilitate safe navigation into/out of apartment building.       Start:  03/13/25    Expected End:  03/27/25               PT Transfers       sit<>stand, bed<>chair, LRD, Ind.       Start:  03/13/25    Expected End:  03/27/25               Pain - Adult              Education Documentation  Body Mechanics, taught by GASTON Perez at 3/13/2025 12:43 PM.  Learner: Patient  Readiness: Eager  Method: Explanation  Response: Verbalizes Understanding  Comment: Roll of PT and POC    Mobility Training, taught by GASTON Perez at 3/13/2025 12:43 PM.  Learner: Patient  Readiness: Eager  Method: Explanation  Response: Verbalizes Understanding  Comment: Roll of PT and POC    Education Comments  No comments found.

## 2025-03-14 VITALS
RESPIRATION RATE: 17 BRPM | BODY MASS INDEX: 19.7 KG/M2 | SYSTOLIC BLOOD PRESSURE: 125 MMHG | OXYGEN SATURATION: 97 % | WEIGHT: 130 LBS | HEIGHT: 68 IN | HEART RATE: 77 BPM | TEMPERATURE: 98.1 F | DIASTOLIC BLOOD PRESSURE: 86 MMHG

## 2025-03-14 LAB
ALBUMIN SERPL BCP-MCNC: 4.4 G/DL (ref 3.4–5)
ANION GAP SERPL CALC-SCNC: 14 MMOL/L (ref 10–20)
BASOPHILS # BLD AUTO: 0.04 X10*3/UL (ref 0–0.1)
BASOPHILS NFR BLD AUTO: 0.6 %
BUN SERPL-MCNC: 17 MG/DL (ref 6–23)
CALCIUM SERPL-MCNC: 9.5 MG/DL (ref 8.6–10.6)
CD19 CELLS # BLD: 0.21 X10E9/L
CD19 CELLS NFR BLD: 15 %
CD19+CD24++CD38++%: 1 %
CD19+CD24-CD38++%: 0.5 %
CD19+CD27+IGD+%: 24.5 %
CD19+CD27+IGD-%: 27 %
CD19+CD27-IGD+%: 46.7 %
CHLORIDE SERPL-SCNC: 106 MMOL/L (ref 98–107)
CO2 SERPL-SCNC: 27 MMOL/L (ref 21–32)
CREAT SERPL-MCNC: 0.92 MG/DL (ref 0.5–1.3)
EGFRCR SERPLBLD CKD-EPI 2021: >90 ML/MIN/1.73M*2
EOSINOPHIL # BLD AUTO: 0.11 X10*3/UL (ref 0–0.7)
EOSINOPHIL NFR BLD AUTO: 1.7 %
ERYTHROCYTE [DISTWIDTH] IN BLOOD BY AUTOMATED COUNT: 12 % (ref 11.5–14.5)
FLOW CYTOMETRY SPECIALIST REVIEW: ABNORMAL
GLUCOSE SERPL-MCNC: 79 MG/DL (ref 74–99)
HCT VFR BLD AUTO: 45.1 % (ref 41–52)
HGB BLD-MCNC: 14.8 G/DL (ref 13.5–17.5)
IMM GRANULOCYTES # BLD AUTO: 0.02 X10*3/UL (ref 0–0.7)
IMM GRANULOCYTES NFR BLD AUTO: 0.3 % (ref 0–0.9)
LYMPHOCYTES # BLD AUTO: 1.59 X10*3/UL (ref 1.2–4.8)
LYMPHOCYTES # SPEC AUTO: 1.42 X10*3/UL
LYMPHOCYTES NFR BLD AUTO: 25.2 %
MAGNESIUM SERPL-MCNC: 2.19 MG/DL (ref 1.6–2.4)
MCH RBC QN AUTO: 31 PG (ref 26–34)
MCHC RBC AUTO-ENTMCNC: 32.8 G/DL (ref 32–36)
MCV RBC AUTO: 95 FL (ref 80–100)
MONOCYTES # BLD AUTO: 0.61 X10*3/UL (ref 0.1–1)
MONOCYTES NFR BLD AUTO: 9.7 %
NEUTROPHILS # BLD AUTO: 3.95 X10*3/UL (ref 1.2–7.7)
NEUTROPHILS NFR BLD AUTO: 62.5 %
NIL(NEG) CONTROL SPOT COUNT: NORMAL
NRBC BLD-RTO: 0 /100 WBCS (ref 0–0)
PANEL A SPOT COUNT: 0
PANEL B SPOT COUNT: 0
PATH REVIEW, B CELL PHENOTYPING, EXTENDED: ABNORMAL
PHOSPHATE SERPL-MCNC: 3.6 MG/DL (ref 2.5–4.9)
PLATELET # BLD AUTO: 190 X10*3/UL (ref 150–450)
POS CONTROL SPOT COUNT: NORMAL
POTASSIUM SERPL-SCNC: 3.6 MMOL/L (ref 3.5–5.3)
RBC # BLD AUTO: 4.77 X10*6/UL (ref 4.5–5.9)
SODIUM SERPL-SCNC: 143 MMOL/L (ref 136–145)
T-SPOT. TB INTERPRETATION: NEGATIVE
WBC # BLD AUTO: 6.3 X10*3/UL (ref 4.4–11.3)

## 2025-03-14 PROCEDURE — 80069 RENAL FUNCTION PANEL: CPT

## 2025-03-14 PROCEDURE — 83735 ASSAY OF MAGNESIUM: CPT

## 2025-03-14 PROCEDURE — 85025 COMPLETE CBC W/AUTO DIFF WBC: CPT

## 2025-03-14 PROCEDURE — G0378 HOSPITAL OBSERVATION PER HR: HCPCS

## 2025-03-14 PROCEDURE — 99238 HOSP IP/OBS DSCHRG MGMT 30/<: CPT

## 2025-03-14 PROCEDURE — 36415 COLL VENOUS BLD VENIPUNCTURE: CPT

## 2025-03-14 ASSESSMENT — COGNITIVE AND FUNCTIONAL STATUS - GENERAL
CLIMB 3 TO 5 STEPS WITH RAILING: A LITTLE
MOBILITY SCORE: 22
DAILY ACTIVITIY SCORE: 24
WALKING IN HOSPITAL ROOM: A LITTLE

## 2025-03-14 ASSESSMENT — PAIN - FUNCTIONAL ASSESSMENT: PAIN_FUNCTIONAL_ASSESSMENT: 0-10

## 2025-03-14 ASSESSMENT — PAIN SCALES - GENERAL: PAINLEVEL_OUTOF10: 0 - NO PAIN

## 2025-03-14 NOTE — PROGRESS NOTES
Transitional Care Coordination Progress Note:  Pts rolator delivered to bedside from [facility]. Outpatient PT/OT scripts provided, and list of  therapy locations provided.  Pts friend to transport home.     Gissell Dumont RN, BSN  Transitional Care Coordinator  Office: [phone]  Secure chat via Haiku

## 2025-03-14 NOTE — PROGRESS NOTES
Spiritual Care Visit  Spiritual Care Request    Reason for Visit:        Request Received From:  Referral From:     Focus of Care:  Visited With: Patient         Refer to :  Referral To:        Spiritual Care Assessment    Spiritual Assessment:                      Care Provided:       Sense of Community and or Caodaism Affiliation:  Episcopalian         Addressed Needs/Concerns and/or [place] Through:  Caodaism Encounters  Caodaism Needs: Prayer  Sacramental Encounters  Sacrament of Sick-Anointing: Anointed    Outcome:        Advance Directives:         Spiritual Care Annotation    Annotation:  Patient received the Sacrament of the Anointing of the Sick by Fr. Doni Hawk,  Episcopalian .       Within the Episcopalian Hinduism the Sacrament of the Sick, also known as the Anointing of the Sick, is a prayer in which we invoke the healing power of God.  Although considered part of 'Last Rites' the Anointing of the Sick, along with Eucharist and Reconciliation, is a repeatable sacrament and should be received by anyone about to undergo surgery, those in recovery and the elderly.  Those who are actively dying should receive the Anointing of the Sick for physical and spiritual healing.    NB: Due to limited after-hours availability, if a patient has already received the Sacrament of the Anointing of the Sick and transitions to comfort care or is actively dying the Episcopalian on-call  will not be able to attend.

## 2025-03-14 NOTE — CARE PLAN
The patient's goals for the shift include  get ready for discharge     The clinical goals for the shift include pt will not have decline in neurological status        Problem: Pain - Adult  Goal: Verbalizes/displays adequate comfort level or baseline comfort level  Outcome: Progressing     Problem: Safety - Adult  Goal: Free from fall injury  Outcome: Progressing     Problem: Discharge Planning  Goal: Discharge to home or other facility with appropriate resources  Outcome: Progressing     Problem: Chronic Conditions and Co-morbidities  Goal: Patient's chronic conditions and co-morbidity symptoms are monitored and maintained or improved  Outcome: Progressing

## 2025-03-14 NOTE — CARE PLAN
The patient's goals for the shift include      The clinical goals for the shift include pt will remain safe and injury free    Over the shift, the patient did not make progress toward the following goals. Barriers to progression include decreasing symptoms. Recommendations to address these barriers include call if need assistance and keep surrounding safe.

## 2025-03-14 NOTE — DISCHARGE SUMMARY
Discharge Diagnosis  MS (multiple sclerosis) (Multi)    Issues Requiring Follow-Up  - Follow-up pre-DMT labs (below)  - Follow-up with Dr. Justice on 3/18/25 at 8:30 am    Test Results Pending At Discharge  Pending Labs       Order Current Status    B Cell Phenotyping, Extended In process    B Cell Phenotyping, Extended, Panel In process    CHELLE Virus Antibody with Reflex To Inhibition Assay In process    Sars-Cov-2 Santi Antibody IgG In process    T-Spot TB In process            Hospital Course  Cale Armenta is a 40 YO RH W M last seen in neurology clinic in August 2021 for RIS who has a PMHx of bipolar disorder (unclear if type I or type II), and PTSD presenting to the ED for re-initiation of neurologic care after noting worsening neurologic symptoms over the last year.      On presentation 3/12, patient reports worsening neurologic symptoms including difficulties with gait, vision changes in the right eye, and sensory changes on the right side, none of which are acutely new but have been slowly progressive over the past year. 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 MRI brain, c/t spine then showed multiple new lesions but none enhancing. Patient was given the option to start a short course of high dose steroids while inpatient to potentially address any low level of inflammation present but he politely declined. Pre-DMT lab work sent.     PT/OT recommended home rehab. Referral for outpatient PT placed and rollator ordered for patient prior to discharge. Vitamin D delivered to patient prior to discharge as well. Neuroimmunology pharmacist was also contacted prior to discharge and confirmed that he will reach out to the patient after discharge to set up Ocrevus infusions. Patient was discharged to home on 3/14/25 with close follow-up with Dr. Justice scheduled for 3/18/25.     Pertinent Physical Exam At Time of Discharge  Physical Exam    Visit Vitals  BP  125/86 (BP Location: Left arm, Patient Position: Lying)   Pulse 77   Temp 36.7 °C (98.1 °F) (Temporal)   Resp 17        MENTAL STATUS:  General appearance: Thin adult male  Orientation: Oriented to person, place, time.  Language: Expression, naming intact  Concentration: Intact  Fund of knowledge: Appropriate  Judgment and Insight: Intact     CRANIAL NERVES:  - II/III: PERRL  - II: Vision blurred in right eye as compared to left, but bilateral visual fields intact to finger count when tested together  - III, IV, VI: EOM full to pursuit without nystagmus  - V: V1-V3 sensation intact bilaterally  - VII: Face muscles symmetric with smile and eye closure  - VIII: Intact to interview  - IX, X: Palate elevated symmetrically bilaterally, no hoarseness  - XI: 5/5 strength on shoulder shrugging bilaterally  - XII: Tongue midline without atrophy or fasciculation     MOTOR: Increased b/l UE tone, b/l LE spasticity      STRENGTH:      R L  Deltoid             5          5  Biceps              5          5  Triceps             5          5                    5          5     Hip flexion       4 4+  Quadriceps      5          5  Hamstrings      5          5  DorsiFlex          4+       5  PlantarFlex       5          5     REFLEXES:        R L  Biceps              3          3  Triceps             3          3  Patellar             3         3  Achilles            4 (4-5 beats clonus)          4 (3-4 beats clonus)     COORDINATION: Ataxia on FTN L>R as well as HTS L>R  SENSORY: Intact to light touch in bl UE and LE but reports numbness in all limbs as well as trunk in band-like distribution, numbness worse on R hemibody compared to L  GAIT: Not assessed this AM    Home Medications     Medication List      START taking these medications     Ultra-Light Rollator misc; Generic drug: walker; 1 each once daily.   Vitamin D2 1,250 mcg (50,000 unit) capsule; Generic drug:   ergocalciferol; Take 1 capsule (1,250 mcg) by mouth 1 (one)  time per week.       Outpatient Follow-Up  Future Appointments   Date Time Provider Department Center   3/18/2025  8:30 AM Chester Justice MD [id number] MD [place]  Neurology PGY-2

## 2025-03-17 ENCOUNTER — APPOINTMENT (OUTPATIENT)
Dept: NEUROLOGY | Facility: HOSPITAL | Age: 40
End: 2025-03-17
Payer: MEDICAID

## 2025-03-17 LAB
SARS-COV-2 AB SERPL-IMP: POSITIVE
SARS-COV-2 IGG SERPL IA-ACNC: >800 AU/ML

## 2025-03-17 NOTE — CARE PLAN
CHW met with patient at bedside, Patient was asleep, patient requested Food resources/paying bills resources. CHW provided and left at bedside monthly food pantries resources in patient local community, including [facility] contact information, CHW provided upcoming utility fairs to assist for assistance with hardship/ additional support, and varies of organizations to assist with paying bills including contact information.     Community Resource Name:   Phone Number:   Staff Member:      Discussed the following topics on behalf of the patient:  []  Behavioral Health Assistance     []  Case Management  []   Assistance  []  Digital Equity Assistance  []  Dental Health Assistance  []  Education Assistance  []  Employment Assistance  [x]  Financial Strain Relief Assistance  [x]  Food Insecurity Assistance  []  Healthcare Coverage Assistance  []  Housing Stability Assistance  []  IP Violence Relief Assistance  []  Legal Assistance  []  Physical Activity Assistance  []  Social Connection Assistance  []  Stress Relief Assistance   []  Substance Abuse Assistance  []  Transportation Assistance  [x]  Utility Assistance  []  Other: [insert comment here]    Next Steps:         JORGE Cary

## 2025-03-17 NOTE — PROGRESS NOTES
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 male patient with hx of bipolar disorder, PTSD, asthma, cannabis use, prior ecstasy use, and prior smoking who was originally self-referred to me in June of 2020 for ruling out autoimmune encephalitis as a possible cause of his psychiatric symptoms. He has a strong family history of psychiatric disorders but he was treated as a child from a neurological disorder of unclear nature per his mother. His brain MRI surprisingly showed extensive periventricular, juxtacortical, brainstem, cerebellar, cervical and thoracic non-enhancing demyelinating lesions. He adamantly denied any history of well-defined episodes of neurological deficits in the past apart from the childhood incident but admitted to chronic urinary frequency and nocturia and Lhermitte's-like phenomenon. He followed for one year with a stable brain MRI and tested negative for AQP4, MOG, ENS1, and sarcoidosis. He was lost to follow up since 2021. He now presents to the ER with worsening gait over the past year (requiring a cane for the past month) with occasional falls and also reports worsening vision in the right eye since March of 2024. Repeat MRIs show multiple new demyelinating lesions in the brain, brainstem, and spinal cord compared to 2021 but no enhancing lesions. 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 We discussed empiric steroid therapy with the caveat of possibly inducing isabel/psychosis, and he preferred to avoid steroids. Will get him evaluated by PT and will start the process to prepare him for Ocrevus outpatient.     Patient was [facility] followed in the [facility] but was lost to follow-up.     Cale Armenta is a 38 YO RH W M last seen in neurology clinic in August 2021 for RIS who has a PMHx of bipolar disorder (unclear if type I or type II), and PTSD presenting to the ED for re-initiation of neurologic care after noting worsening neurologic symptoms over the last year.      On presentation 3/12, patient reports worsening neurologic symptoms including difficulties with gait, vision changes in the right eye, and sensory changes on the right side, none of which are acutely new but have been slowly progressive over the past year. 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 MRI brain, c/t spine then showed multiple new lesions but none enhancing. Patient was given the option to start a short course of high dose steroids while inpatient to potentially address any low level of inflammation present but he politely declined. Pre-DMT lab work sent.      PT/OT recommended home rehab. Referral for outpatient PT placed and rollator ordered for patient prior to discharge. Vitamin D delivered to patient prior to discharge as well. Neuroimmunology pharmacist was also contacted prior to discharge and confirmed that he will reach out to the patient after discharge to set up Ocrevus infusions. Patient was discharged to home on 3/14/25 with close follow-up with Dr. Justice scheduled for 3/18/25.     Assigned a primary care but has not gotten back to him. Wants a PCP.    MS symptom review:     weakness: yes BLE progressive over 2024  sensory changes: yes right hemibody  incoordination: no   falling: yes frequent falls  gait  change: yes uses a cane over the past month.   painful vision loss: right eye vision impairment in March of 2024, not investigated.   double vision: never   vertigo: no   facial/bulbar weakness: no  Lhermitte's: rush feeling when bends.   bladder/bowel dysfunction: yes nocturia and urinary frequency over the past year.      spasticity: yes   tonic spasms: not asked   tremors: not asked   RLS: not asked   dystonia: not asked   other movement disorders: no     heat sensitivity: no   fatigue: yes  depression: no   anxiety: yes  cognitive changes: yes poor short term memory and lack of focus.      DMT: none   medication side effects: NA   last blood work: NA     Vitamin D: 37962 units weekly   symptomatic meds: none.     ROS:  A comprehensive review-of-systems was obtained and negative unless noted above in the HPI.    Past Medical History  Past Medical History:   Diagnosis Date    Personal history of urinary calculi 09/26/2019    History of renal calculi     Surgical History  Past Surgical History:   Procedure Laterality Date    ELBOW SURGERY  12/29/2014    Elbow Surgery    MOUTH SURGERY  12/29/2014    Oral Surgery Tooth Extraction     Social History  Social History     Tobacco Use    Smoking status: Never    Smokeless tobacco: Never     Allergies  Patient has no known allergies.    =========  Medications    Current Outpatient Medications:     ergocalciferol (Vitamin D-2) 1250 mcg (50,000 units) capsule, Take 1 capsule (1,250 mcg) by mouth 1 (one) time per week., Disp: 25 capsule, Rfl: 1    walker (Ultra-Light Rollator) misc, 1 each once daily., Disp: 1 each, Rfl: 0     =========    Objective   Vital Signs  There were no vitals taken for this visit.    Physical Exam  Cognitive Status: A&Ox4. Language expression, comprehension, and repetition intact. Recalls 3/3 words in 5 minutes. Remains focused during interview.     Cranial Nerves: VF full to confrontation with binocular testing; PERRL (4 --> 2) b/l; EOM full-range  with smooth pursuits and no gaze-evoked nystagmus. There is a right eye exophoria. Saccades accurate, conjugate, and rapid; intact vergence; face sensation & strength symmetric; tongue midline; shoulders strong.     Motor: Normal bulk and mildly increased tone without spastic catching. No pronator drift. There are no adventitious movements noted.     Strength: Right ADM is 4/5, right FDI is 5-/5. Remainder of extremities are 5/5, both proximally and distally.     Reflexes: 3+ to all modalities. Olivarez's present on the LUE and absent on the RUE. There are 2 beats of inducible ankle clonus bilaterally.     Sensory: intact and symmetric to light touch     Coordination: normokinetic w/o ataxia or action tremor on FtN and HtS.     Rapid Movements: rapid finger & foot tapping w/o dysdiadochokinesia     Gait: station is broad-based; uses a walker to ambulate. Diminished stride length. Ataxic gait.      Add'l Maneuvers: 25-ft walk time is 9.0 seconds when using a walker for assistance.        Recent/Relevant Labs:  Hemoglobin   Date/Time Value Ref Range Status   03/14/2025 06:46 AM 14.8 13.5 - 17.5 g/dL Final     WBC   Date/Time Value Ref Range Status   03/14/2025 06:46 AM 6.3 4.4 - 11.3 x10*3/uL Final     Sodium   Date/Time Value Ref Range Status   03/14/2025 06:46  136 - 145 mmol/L Final     Potassium   Date/Time Value Ref Range Status   03/14/2025 06:46 AM 3.6 3.5 - 5.3 mmol/L Final     Chloride   Date/Time Value Ref Range Status   03/14/2025 06:46  98 - 107 mmol/L Final     Urea Nitrogen   Date/Time Value Ref Range Status   03/14/2025 06:46 AM 17 6 - 23 mg/dL Final     Creatinine   Date/Time Value Ref Range Status   03/14/2025 06:46 AM 0.92 0.50 - 1.30 mg/dL Final     Magnesium   Date/Time Value Ref Range Status   03/14/2025 06:46 AM 2.19 1.60 - 2.40 mg/dL Final     Phosphorus   Date/Time Value Ref Range Status   03/14/2025 06:46 AM 3.6 2.5 - 4.9 mg/dL Final     Comment:     The performance characteristics  of phosphorus testing in heparinized plasma have been validated by the individual  laboratory site where testing is performed. Testing on heparinized plasma is not approved by the FDA; however, such approval is not necessary.     Calcium   Date/Time Value Ref Range Status   03/14/2025 06:46 AM 9.5 8.6 - 10.6 mg/dL Final     Total Protein   Date/Time Value Ref Range Status   03/12/2025 12:47 PM 7.7 6.4 - 8.2 g/dL Final     Albumin   Date/Time Value Ref Range Status   03/14/2025 06:46 AM 4.4 3.4 - 5.0 g/dL Final     Bilirubin, Total   Date/Time Value Ref Range Status   03/12/2025 12:47 PM 1.0 0.0 - 1.2 mg/dL Final     AST   Date/Time Value Ref Range Status   03/12/2025 12:47 PM 11 9 - 39 U/L Final     ALT   Date/Time Value Ref Range Status   03/12/2025 12:47 PM 6 (L) 10 - 52 U/L Final     Comment:     Patients treated with Sulfasalazine may generate falsely decreased results for ALT.     Alkaline Phosphatase   Date/Time Value Ref Range Status   03/12/2025 12:47 PM 55 33 - 120 U/L Final      LDL   Date/Time Value Ref Range Status   09/26/2019 01:01 PM 59 0 - 99 mg/dL Final     Comment:     .                           NEAR      BORD      AGE      DESIRABLE  OPTIMAL    HIGH     HIGH     VERY HIGH     0-19 Y     0 - 109     ---    110-129   >/= 130     ----    20-24 Y     0 - 119     ---    120-159   >/= 160     ----      >24 Y     0 -  99   100-129  130-159   160-189     >/=190  .       Triglycerides   Date/Time Value Ref Range Status   09/26/2019 01:01 PM 40 0 - 149 mg/dL Final     Comment:     .      AGE      DESIRABLE   BORDERLINE HIGH   HIGH     VERY HIGH   0 D-90 D    19 - 174         ----         ----        ----  91 D- 9 Y     0 -  74        75 -  99     >/= 100      ----    10-19 Y     0 -  89        90 - 129     >/= 130      ----    20-24 Y     0 - 114       115 - 149     >/= 150      ----         >24 Y     0 - 149       150 - 199    200- 499    >/= 500  .   Venipuncture immediately after or during the     administration of Metamizole may lead to falsely   low results. Testing should be performed immediately   prior to Metamizole dosing.             Recent/Relevant Imaging:  MR brain w and wo IV contrast    Result Date: 3/13/2025  Interpreted By:  Naila Elliott and Barbat Antonio STUDY: MR CERVICAL SPINE W AND WO IV CONTRAST; MR THORACIC SPINE W AND WO IV CONTRAST; MR BRAIN W AND WO IV CONTRAST;  3/12/2025 9:07 pm; 3/12/2025 9:18 pm; 3/12/2025 9:27 pm   INDICATION: Signs/Symptoms:Known RIS with cord lesions; concern for SPMS; Signs/Symptoms:Known RIS with cord lesions; conern for SPMS; Signs/Symptoms:Known RIS; now concerned for SPMS with new lesions; last MRI 2021.   COMPARISON: MRI brain 06/19/2021. MRI thoracic and cervical spine 08/08/2020.   ACCESSION NUMBER(S): [id number]; [id number]; [id number]   ORDERING CLINICIAN: YANN GARDNER   TECHNIQUE: MS protocol multiplanar multisequence MR imaging was performed through the brain prior to and following administration of intravenous Dotarem gadolinium based contrast.   Multiplanar multisequence MR imaging was performed through the cervical and thoracic spine prior to and following the administration of intravenous contrast. Noncontrast sagittal T1, T2, STIR, axial T1 and axial T2 weighted images were acquired through the cervical and thoracic spine. Postcontrast sagittal and axial imaging obtained.   Total contrast dose: 12 ML Dotarem.   FINDINGS: MRI BRAIN:   Multifocal FLAIR hyperintense signal within the subcortical and periventricular white matter. Similar to prior exam, several of the periventricular lesions demonstrate perpendicular orientation in relation to the lateral ventricles. There are additional FLAIR hyperintense foci involving the right basal ganglia, left thalamus, radha, and bilateral cerebellar hemispheres. In the interval since 06/21/2021, there has been development of multiple additional new FLAIR hyperintense lesions, for example involving  the parasagittal frontoparietal lobes bilaterally (series 3, image 7, image 10) and periventricular white matter (series 3, image 17), adjacent to the left aspect of the splenium of the corpus callosum (series 3, image 19), right midbrain (series 3, image 23), right radha (series 3, image 27) and right medulla near the area of upstream (series 3, image 33). These lesions are predominantly T1 hypointense. There is no significant mass effect or midline shift.   The new lesion within the deep white matter of the left frontoparietal lobe (series 3, image 14) demonstrates a FLAIR hyperintense ring-like signal with internal FLAIR hypointense signal, however does not demonstrate a ring of enhancement. The new lesion in the right radha demonstrates a subtle component of questionable diffusion restriction with intermediate to high DWI signal and intermediate to low ADC signal, however does not demonstrate definitive enhancement. The remainder of the these lesions do not demonstrate any diffusion restriction or post-contrast enhancement.   The previously described hyperintense FLAIR signal along the atrium of the bilateral lateral ventricles has increased in size with increased prominence of internal FLAIR hypointense signal, which may represent a component of cystic encephalomalacia or atrophy. There is minimal associated ex vacuo dilatation of the bilateral lateral ventricles. There is otherwise a stable component of background mild general parenchyma as volume loss. The basal cisterns are patent.   There are no other areas of diffusion restriction abnormality to suggest acute infarct. No evidence of recent intracranial hemorrhage. No hemosiderin staining is present. There is no abnormal parenchymal or leptomeningeal enhancement.   Although this exam is not optimized for evaluation of the orbits, there is no significant abnormality.   Mucous retention cyst versus polyp within the left maxillary sinus. Scattered mucosal  thickening throughout the paranasal sinuses. The mastoid air cells are clear.   There is no abnormal osseous enhancement.   MRI CERVICAL SPINE:   Alignment: The vertebral alignment is within normal limits.   Vertebrae/Intervertebral Discs: The vertebral bodies demonstrate expected height. The marrow signal is within normal limits. Mild multilevel loss of disc height. There is multilevel disc desiccation from the levels of C2-C3 through C5-C6.   Cord: Interval increase in size of the previously described T2 hyperintensity at the C5-C6 level, now extending from the level of C4-C5 to C5-C6 and measuring 2.3 cm in craniocaudal dimension (prior: 1.5 cm).  Additionally, there is a T2 hyperintense lesion at the level of C3-C4 measuring about 1.7 in the craniocaudal dimension, also with associated hyperintense STIR signal consistent with edema, which was not definitively seen on prior exam.   C1-C2: The cervicomedullary junction appears unremarkable. There is no spinal canal stenosis.   C2-C3: There is no posterior disc contour abnormality. There is no significant spinal canal or neural foraminal stenosis.   C3-C4: Small posterior disc bulge. Mild ventral effacement of the thecal sac, without overt spinal canal stenosis. There is no significant neural foraminal stenosis.   C4-C5: Small posterior disc bulge. Mild ventral effacement of the thecal sac, without overt spinal canal stenosis. There is no significant neural foraminal stenosis.   C5-C6: There is no posterior disc contour abnormality. There is no significant spinal canal or neural foraminal stenosis.   C6-C7: Small posterior disc bulge. Mild ventral effacement of the thecal sac, without overt spinal canal stenosis. There is no significant neural foraminal stenosis.   C7-T1: There is no posterior disc contour abnormality.  There is no significant spinal canal or neural foraminal stenosis.   The prevertebral and posterior paraspinous soft tissues are within normal  limits.   MRI THORACIC SPINE:   Alignment: Within normal limits.   Vertebrae/Intervertebral Discs: The vertebral body heights are intact. There are T1 hyperintense lesions with internal speckled T1 hypointense signal within the T2, T4 and T8 vertebral bodies, favored to represent intravertebral hemangiomas. The marrow signal is otherwise within normal limits. The disc spaces are preserved.   Cord: Interval increase in size of the previously described T2 hyperintense/STIR hyperintense signal within the cord at the T4-T5 level, now extending up to the level of T3. There is no associated enhancement with the signal. Additionally, there has been interval development of a T2 hyperintense/STIR hyperintense signal within the cord from the levels of T6 through T8. There is no associated enhancement with the signal. Similar T2 hyperintense/STIR hyperintense signal within the cord at the level of T1-T2 without associated enhancement.   T1-2: There is no significant spinal canal stenosis. T2-3: There is no significant spinal canal stenosis. T3-4: There is no significant spinal canal stenosis. T4-5: There is no significant spinal canal stenosis. T5-6: There is no significant spinal canal stenosis. T6-7: Similar focal central disc herniation, with mild ventral effacement of the thecal sac without overt spinal canal stenosis. T7-8: Similar focal central disc herniation, with mild ventral effacement of the thecal sac without spinal canal stenosis. T8-9: There is no significant spinal canal stenosis. T9-10: There is no significant spinal canal stenosis. T10-11: There is no significant spinal canal stenosis. T11-12: There is no significant spinal canal stenosis.   Paraspinous Soft Tissues: Within normal limits.       MRI BRAIN:   Redemonstration of large burden demyelinating plaques predominantly within the periventricular white matter. There is significant progression of disease involving the brainstem as detailed above;  periventricular lesions are also slightly increased compared to previous MRI.   No definite evidence of active demyelination.   Lesions within the basal ganglia and midbrain are in close proximity to the corticospinal tracts, which may be involved.   No acute infarct or intracranial hemorrhage. Mild diffuse parenchymal volume loss.   MRI CERVICAL SPINE:   1. Interval development of a demyelinating plaque within the cord at the level of C3-C4. Additionally, there has been interval increase in size of the previously described demyelinating plaque within the cord at the C5-C6 level, now extending from the levels of C4 through C6. 2. No evidence of active disease.   MRI THORACIC SPINE: 1. Interval increase in size of the previously described demyelinating plaque within the cord T4-T5 level, now extending from T3-T5. Interval development of a demyelinating plaque within the cord from the levels of T6 through T8. 2. No evidence of active demyelination. 3. Similar demyelinating plaque within the cord of the level of T1-T2.   I personally reviewed the images/study and I agree with the findings as stated by Archie Moore MD. This study was interpreted at [facility], [place], [place]   MACRO: None   Signed by: Naila Elliott 3/13/2025 9:15 AM Dictation workstation:   [id number]    CT head wo IV contrast    Result Date: 3/12/2025  Interpreted By:  Peyman Tyson, STUDY: CT HEAD WO IV CONTRAST;  3/12/2025 1:05 pm   INDICATION: Signs/Symptoms:ms exacerbation with worsening gait.     COMPARISON: Report only of MRI brain 06/19/2021.   ACCESSION NUMBER(S): [id number]   ORDERING CLINICIAN: PAULA BOLAÑOS   TECHNIQUE: Noncontrast axial CT scan of head was performed. Angled reformats in brain and bone windows were generated. The images were reviewed in bone, brain, blood and soft tissue windows.   FINDINGS: Periventricular and deep white matter hypodensities bilateral cerebral hemispheres with a  parietal and temporal predominance suggestive of the primary demyelinating disease process. Findings are similar to the MRI from 2021.   No intracranial hemorrhage. No significant intracranial mass effect. No evidence of large evolving cortical infarction.   Paranasal sinuses and mastoid air cells are well aerated.       Periventricular and deep white matter hypodensities bilateral cerebral hemispheres with a parietal and temporal predominance suggestive of the primary demyelinating disease process. The report of the 2021 MRI describes extensive demyelinating disease process. However, those images are not currently available for review.     MACRO: None   Signed by: Peyman Tyson 3/12/2025 1:41 PM Dictation workstation:   [id number]      Other Recent/Relevant Studies:  No EMG results found for the past 12 months   No EEG results found for the past 12 months   No echocardiogram results found for the past 12 months  No results found for this or any previous visit (from the past 4464 hours).     =========  Assessment/Plan   This is a 36 yo male patient with hx of bipolar disorder, PTSD, asthma, cannabis use, prior ecstasy use, and prior smoking who was self-referred in June of 2020 for ruling out autoimmune encephalitis as a possible cause of his psychiatric symptoms. These symptoms started five years ago after his wife left him and were never associated with seizures, involuntary movements, or other neurological symptoms. He has a strong family history of psychiatric disorders but he was treated as a child from a neurological disorder of unclear nature per his mother. His neurological exam is essentially normal except for tangentiality and thought racing. Autoimmune encephalitis is unlikely and his symptoms are most probably primary psychiatric in nature. I will obtain brain MRI to evaluate his vague neurological history.      07/13/2020: His brain MRI surprisingly showed extensive periventricular, juxtacortical,  "brainstem, and cerebellar non-enhancing demyelinating lesions. Sagittal FLAIR also shows a short segment demyelinating lesion in the upper cervical cord. He adamantly denies any history of well-defined episodes of neurological deficits in the past. He does note urinary frequency and nocturia over the past year and admits to occasional fatigue. He noted that when he bends his body forward that he gets a \"tension-release\" sensation in his upper body but no classical Lhermitte's. The cause of his brain demyelination is unclear and it is also unclear if it's related to his psychiatric symptoms. DD includes remote childhood ADEM, RIS, demyelinating autoimmune encephalitis, and atypical MS or other demyelinating syndromes. Further workup is needed for diagnosis and planning future treatment if indicated.      08/17/2020: Improving psychiatric symptoms. His cervical and thoracic MRIs showed multiple typical short segment demyelinating lesions with report of vague enhancement at the C5 level that I couldn't personally see. HIs labs for MS mimics came back negative for AQP, MOG, and ENS1 antibodies. His ACE level came back extremely elevated. Will need to rule out sarcoidosis. Once this is ruled out, we will have to decide on DMT. As of now there is really no clear evidence of dissemination in time except his vague bladder symptom history over the past year. We discussed doing a spinal tap to look for OCBs but he is refusing a spinal tap. The alternative is close MRI monitoring every six months.      01/19/2021: MRI stable. No neurological symptoms. Developed some rash after taking vitamin D.      08/10/2021: No new neurological symptoms. MRI stable. stopped vitamin D. will restart and switch to annual monitoring.     3/18/2025: He followed for one year with a stable brain MRI and tested negative for AQP4, MOG, ENS1, and sarcoidosis. He was lost to follow up since 2021. He then presented to the ER in 3/2025 with worsening " gait over the past year (requiring a cane for the past month) with occasional falls and also reports worsening vision in the right eye since March of 2024. Repeat MRIs show multiple new demyelinating lesions in the brain, brainstem, and spinal cord compared to 2021 but no enhancing lesions. He is likely in the secondary progressive phase of what is likely pediatric-onset highly active MS. We discussed empiric steroid therapy with caveat of possibly inducing isabel/psychosis, and he preferred to avoid steroids. will start Ocrevus.      PLAN:  - DMT: will start ocrevus. Mechanism of action, efficacy, side effects, risks, and alternatives associated with the medication were discussed and information material was provided. All questions answered.   - Acute relapse management: not indicated.   - Symptomatic: may need bladder treatment or urology referral.   - MRI: WIll order MRI WWO to be done in September 2025 for monitoring.  - continue vitamin D3 68562 UNITS once a week.  - smoker: Counseled extensively. He quit.   - PT/OT: will order  - Follow up: in September after MRI.      The impression and plan were discussed with the patient and their family (if present) in detail and all questions answered. They agreed to the plan as outlined above.         Jimmy Castellano MD  Neurology Resident PGY-4  [facility]    Attending addendum:      I edited the note above and co-wrote the assessment and plan.       Medical decision making was high complexity.     Chester Justice MD

## 2025-03-18 ENCOUNTER — SPECIALTY PHARMACY (OUTPATIENT)
Dept: NEUROLOGY | Facility: HOSPITAL | Age: 40
End: 2025-03-18
Payer: MEDICAID

## 2025-03-18 ENCOUNTER — OFFICE VISIT (OUTPATIENT)
Dept: NEUROLOGY | Facility: HOSPITAL | Age: 40
End: 2025-03-18
Payer: MEDICAID

## 2025-03-18 VITALS
BODY MASS INDEX: 19.7 KG/M2 | TEMPERATURE: 97.1 F | HEIGHT: 68 IN | SYSTOLIC BLOOD PRESSURE: 113 MMHG | RESPIRATION RATE: 18 BRPM | WEIGHT: 130 LBS | HEART RATE: 67 BPM | DIASTOLIC BLOOD PRESSURE: 76 MMHG

## 2025-03-18 DIAGNOSIS — Z75.8 DOES NOT HAVE PRIMARY CARE PROVIDER: ICD-10-CM

## 2025-03-18 DIAGNOSIS — G35 SECONDARY PROGRESSIVE MULTIPLE SCLEROSIS (MULTI): Primary | ICD-10-CM

## 2025-03-18 DIAGNOSIS — G35 MS (MULTIPLE SCLEROSIS) (MULTI): Primary | ICD-10-CM

## 2025-03-18 PROCEDURE — 1036F TOBACCO NON-USER: CPT | Performed by: PSYCHIATRY & NEUROLOGY

## 2025-03-18 PROCEDURE — 99215 OFFICE O/P EST HI 40 MIN: CPT | Mod: GC | Performed by: PSYCHIATRY & NEUROLOGY

## 2025-03-18 PROCEDURE — 3008F BODY MASS INDEX DOCD: CPT | Performed by: PSYCHIATRY & NEUROLOGY

## 2025-03-18 PROCEDURE — 99215 OFFICE O/P EST HI 40 MIN: CPT | Performed by: PSYCHIATRY & NEUROLOGY

## 2025-03-18 RX ORDER — ALBUTEROL SULFATE 0.83 MG/ML
3 SOLUTION RESPIRATORY (INHALATION) AS NEEDED
OUTPATIENT
Start: 2025-03-18

## 2025-03-18 RX ORDER — DIPHENHYDRAMINE HYDROCHLORIDE 50 MG/ML
50 INJECTION, SOLUTION INTRAMUSCULAR; INTRAVENOUS AS NEEDED
OUTPATIENT
Start: 2025-03-18

## 2025-03-18 RX ORDER — HEPARIN SODIUM,PORCINE/PF 10 UNIT/ML
50 SYRINGE (ML) INTRAVENOUS AS NEEDED
OUTPATIENT
Start: 2025-03-18

## 2025-03-18 RX ORDER — HEPARIN 100 UNIT/ML
500 SYRINGE INTRAVENOUS AS NEEDED
OUTPATIENT
Start: 2025-03-18

## 2025-03-18 RX ORDER — ACETAMINOPHEN 325 MG/1
650 TABLET ORAL ONCE
OUTPATIENT
Start: 2025-03-18

## 2025-03-18 RX ORDER — FAMOTIDINE 10 MG/ML
20 INJECTION, SOLUTION INTRAVENOUS ONCE AS NEEDED
OUTPATIENT
Start: 2025-03-18

## 2025-03-18 RX ORDER — DIPHENHYDRAMINE HYDROCHLORIDE 50 MG/ML
50 INJECTION, SOLUTION INTRAMUSCULAR; INTRAVENOUS ONCE
OUTPATIENT
Start: 2025-03-18

## 2025-03-18 RX ORDER — EPINEPHRINE 0.3 MG/.3ML
0.3 INJECTION SUBCUTANEOUS EVERY 5 MIN PRN
OUTPATIENT
Start: 2025-03-18

## 2025-03-18 ASSESSMENT — PAIN SCALES - GENERAL: PAINLEVEL_OUTOF10: 0-NO PAIN

## 2025-03-18 NOTE — PATIENT INSTRUCTIONS
"Mr. Armenta,    It was a pleasure seeing you in the [facility] today. You were seen today for followup of secondary progressive multiple sclerosis. We discussed the initiation of a medication called \"Ocrevus\" including the potential side effects and the lab monitoring that is necessary. Please remember that the first few infusions can result in a skin rash or dizziness. Because this reaction can happen on first-exposure, we split the first infusion into two separate doses taken 2 weeks apart, then we start giving it every 6 months. If people do have reactions, they typically occur on infusion # 1 and/or infusion # 3.     Our neuroimmunology pharmacist Dr. Jez Flores will be setting up your infusion appointments. You will need an MRI Brain in September to monitor your disease. After that, we'll see you in MS clinic.    We also discussed that you need an internist. We recommend either Dr. Trinity Cohen or Dr. Kendrick Arias; we have placed a referral.    If you need to reach us, please call the neuroimmunology clinic number at [phone].    Return to clinic in 6 months.    Thank you,    Jimmy Castellano MD (Neurology Resident)  Chester Justice MD, PhD (Neuroimmunology Attending)  [facility]  "

## 2025-03-21 LAB
JC VIRUS AB (SJC): ABNORMAL
JCV INDEX VALUE (SJC): 0.26
JCV INHIBITION ASSAY (SJC): NORMAL

## 2025-03-27 ENCOUNTER — DOCUMENTATION (OUTPATIENT)
Dept: INFUSION THERAPY | Facility: CLINIC | Age: 40
End: 2025-03-27
Payer: MEDICAID

## 2025-03-27 NOTE — PROGRESS NOTES
CLINICAL CLEARANCE FOR OUTPATIENT INFUSION      Patient to be scheduled for New Start  of Ocrevus Infusions.     Dosed on Day 1 and Day 15 (Induction)     For Diagnosis: Multiple Sclerosis    Premeds ordered? Yes    Labs Required Prior to First Treatment:    T-Spot Drawn/Results:   Lab Results   Component Value Date    TBSIN Negative 03/12/2025      Hep B Sag Drawn/Results:   Lab Results   Component Value Date    HEPBSAG Nonreactive 03/12/2025      Last infusion received: NA  (if continuation) Due: ANYTIME    Induction and Maintenance Therapy Plans entered if needed? No (if no provider to be contacted to etner)    Okay to schedule for treatment as ordered by prescribing provider     ALEKSANDAR Siu-CNP    [facility])  [address], [place] [address]  Phone: [phone]

## 2025-04-14 ENCOUNTER — OFFICE VISIT (OUTPATIENT)
Dept: PRIMARY CARE | Facility: HOSPITAL | Age: 40
End: 2025-04-14
Payer: MEDICAID

## 2025-04-14 VITALS
TEMPERATURE: 98.2 F | BODY MASS INDEX: 17.64 KG/M2 | OXYGEN SATURATION: 99 % | DIASTOLIC BLOOD PRESSURE: 86 MMHG | SYSTOLIC BLOOD PRESSURE: 130 MMHG | HEIGHT: 71 IN | WEIGHT: 126 LBS | HEART RATE: 59 BPM

## 2025-04-14 DIAGNOSIS — R29.818 DISABILITY DUE TO NEUROLOGICAL DISORDER: ICD-10-CM

## 2025-04-14 DIAGNOSIS — Z23 NEED FOR HEPATITIS B VACCINATION: ICD-10-CM

## 2025-04-14 DIAGNOSIS — Z23 NEED FOR TDAP VACCINATION: ICD-10-CM

## 2025-04-14 DIAGNOSIS — Z59.41 FOOD INSECURITY: ICD-10-CM

## 2025-04-14 DIAGNOSIS — Z00.00 HEALTHCARE MAINTENANCE: Primary | ICD-10-CM

## 2025-04-14 DIAGNOSIS — Z87.442 HISTORY OF NEPHROLITHIASIS: ICD-10-CM

## 2025-04-14 SDOH — ECONOMIC STABILITY: FOOD INSECURITY: WITHIN THE PAST 12 MONTHS, YOU WORRIED THAT YOUR FOOD WOULD RUN OUT BEFORE YOU GOT MONEY TO BUY MORE.: OFTEN TRUE

## 2025-04-14 SDOH — ECONOMIC STABILITY: FOOD INSECURITY: WITHIN THE PAST 12 MONTHS, THE FOOD YOU BOUGHT JUST DIDN'T LAST AND YOU DIDN'T HAVE MONEY TO GET MORE.: OFTEN TRUE

## 2025-04-14 SDOH — ECONOMIC STABILITY - FOOD INSECURITY: FOOD INSECURITY: Z59.41

## 2025-04-14 ASSESSMENT — PATIENT HEALTH QUESTIONNAIRE - PHQ9
2. FEELING DOWN, DEPRESSED OR HOPELESS: NOT AT ALL
1. LITTLE INTEREST OR PLEASURE IN DOING THINGS: NOT AT ALL
SUM OF ALL RESPONSES TO PHQ9 QUESTIONS 1 AND 2: 0

## 2025-04-14 ASSESSMENT — ENCOUNTER SYMPTOMS
DEPRESSION: 0
LOSS OF SENSATION IN FEET: 0
OCCASIONAL FEELINGS OF UNSTEADINESS: 1

## 2025-04-14 ASSESSMENT — PAIN SCALES - GENERAL: PAINLEVEL_OUTOF10: 0-NO PAIN

## 2025-04-14 NOTE — PROGRESS NOTES
[facility]    HPI:  Cale Armenta is a 39 y.o. male with history of recently worsening multiple sclerosis presents to establish primary care. No specific chief complaints today.    He recently established care with Neurology after a recent hospitlization with Neurology due to worsening neurologic symptoms including difficulties with gait, vision changes in the right eye, and sensory changes on the right side, none of which are acutely new but have been slowly progressive over the past year. There is high concern for 2ndary progressive multiple sclerosis. Additional findings on MRI imaging supportive of diagnosis. He had pre-DMT lab work completed and is scheduled to start Ocrevus.     Recently completed labwork prior to planned Ocrevus:  -HIV/Hep B/C Negative  -CHELLE Virus, Tspot Negative  -Hep B Titers Low  -Varicella Indeterminate  -CBC/RFP wnl    Today, patient is doing well overall and feels blessed despite recent disease management. States he remains hopeful w/ good mood. In the past 6 months - endorses a few mechanical falls but no trauma. Previously w/ cane, now w/ improved ambulation using new rollator though sometimes still uses cane to [place]. Denies fevers, chills, nausea, vomiting, diarrhea. Endorses constipation but manageable w/ dietary changes. No need to strain. Denies chest pain, or SOB. Urinary - No urge, No incontinence.     Fx:   -No Hx of prostate CA in family    Sx:   -Lives at home with mother. .  -Mood; Good & Hopeful, Sleep; Usually good full night of sleep  -No children  -Tobacco; Age 14-35- 1 to 1.5 PPD  -Denies substance use  -1 Beer / year    Health behaviors:  Diet and exercise: States he has a balanced diet of Protein, fiber. Vegetables, fruits.   However states there is significant food scarcity at home.  Dental exams: None recently, Has a few cavities, barriers to care (transport/money)  Eye exams: April 10th, wnl    Additionally, requests for a  referral for cognitive screening for his disability case and a PCP appointment for mother.    Health maintenance:  Health Maintenance   Topic Date Due    Yearly Adult Physical  Never done    MMR Vaccines (1 of 1 - Standard series) Never done    COVID-19 Vaccine (1) Never done    Varicella Vaccines (1 of 2 - 13+ 2-dose series) Never done    Hepatitis B Vaccines (1 of 3 - 19+ 3-dose series) Never done    Pneumococcal Vaccine: Pediatrics and At-Risk Adult Patients (1 of 2 - PCV) Never done    DTaP/Tdap/Td Vaccines (1 - Tdap) Never done    Lipid Panel  09/26/2024    Influenza Vaccine (Season Ended) 09/01/2025    Zoster Vaccines (1 of 2) 05/26/2034    HIV Screening  Completed    Hepatitis C Screening  Completed    HIB Vaccines  Aged Out    IPV Vaccines  Aged Out    Hepatitis A Vaccines  Aged Out    Meningococcal Vaccine  Aged Out    Rotavirus Vaccines  Aged Out    HPV Vaccines  Aged Out       Medications:    Current Outpatient Medications:     ergocalciferol (Vitamin D-2) 1250 mcg (50,000 units) capsule, Take 1 capsule (1,250 mcg) by mouth 1 (one) time per week., Disp: 25 capsule, Rfl: 1    ocrelizumab (Ocrevus) 30 mg/mL, Infuse 10 mL (300 mg total) into a venous catheter every 14 (fourteen) days.  at day 1 and 15, Disp: 10 mL, Rfl: 1    walker (Ultra-Light Rollator) misc, 1 each once daily., Disp: 1 each, Rfl: 0    Allergies:  Not on File    Past medical history:  Past Medical History:   Diagnosis Date    Personal history of urinary calculi 09/26/2019    History of renal calculi       Surgical history:  Past Surgical History:   Procedure Laterality Date    ELBOW SURGERY  12/29/2014    Elbow Surgery    MOUTH SURGERY  12/29/2014    Oral Surgery Tooth Extraction       Family history:  No family history on file.    Social history:   reports that he has never smoked. He has never used smokeless tobacco. He reports that he does not drink alcohol and does not use drugs.    Social History     Social History Narrative    Not on  file       Review of systems:  Constitutional: negative for fevers, chills, weight loss, weight gain, change in appetite, fatigue, weakness.  HEENT: negative for headache, changes in vision or hearing, congestion, sore throat.  Respiratory: negative for SOB, cough, hemoptysis, wheezing  Cardiovascular: negative for chest pain, palpitations, orthopnea, PND  GI: negative for dysphagia, abdominal pain, nausea, vomiting, diarrhea, constipation, melena, hematochezia, BRBPR  : negative for frequency, urgency, dysuria, hematuria, incontinence  MSK: negative for myalgia, arthralgia, decreased joint ROM, LE swelling  Skin: negative for rash, wounds  Heme/lymph: negative for easy bruising, bleeding, epistaxis  Neuro: negative for LOC, numbness, tingling, tremor, vertigo, dizziness    Vitals:  Vitals:    04/14/25 1300   BP: 130/86   Pulse: 59   Temp: 36.8 °C (98.2 °F)   SpO2: 99%       Physical exam:  Constitutional: Thin-appearing male in no acute distress.   HEENT: Normocephalic, atraumatic. PERRL. EOMI. No cervical lymphadenopathy.  Respiratory: CTA bilaterally. No wheezes, rales, or rhonchi. Normal respiratory effort.  Cardiovascular: RRR. No murmurs, gallops, or rubs. No JVD. Radial pulses 2+.  Abdominal: Soft, nondistended, nontender to palpation. Bowel sounds present. No hepatosplenomegaly or masses. No CVA tenderness.  Neuro: CN II-XII intact. UE and LE strength 5/5 bilaterally (lower and upper) and sensation mainly 'altered' on R. +Spasticity and visible increased effort on R.  MSK: No LE edema bilaterally.  Skin: Warm, dry. No rashes or wounds.  Psych: Appropriate mood and affect.    Labs:  No results found for this or any previous visit (from the past 24 hours).    Imaging:  Imaging  No results found.    Cardiology, Vascular, and Other Imaging  No other imaging results found for the past 7 days    Assessment and Plan:  Cale Armenta is a 39 y.o. male with history of food insecurity here to establish primary  care prior to initiating DMT (Ocrevus) for multiple sclerosis. Chief complaints mainly request for referrals and healthcare maintenance goals. Vitals notable for BMI 17 iso food scarcity due to financial reasons. Patient educated and informed of [facility] [employer] services. Planned to initiate Ocrevus 4/17: after review of pre-DMT labs - Plan to administer vaccines and repeat blood work.    Summary for 04/14/25:  -Lab work: Vit D, TSH, Lipid Panel, VZV Titer, UA, Uric Acid  -CT Abdomen wo contrast   -Vaccine: Hepatitis B (1 of 3 Engerix), Prevnar 20 (discuss at next visit), Tdap  -[facility] referral  -Neuropsychology referral for disability application    # MS  :: Follows w/ Neurology (Dr. Chester Justice)  :: Planned for Ocrevus infusion, Hep B non-immune  Plan:  -Monitor for infections on Ocrevus (Planned initiation 4/17)  -C/w VitD Supplementation    #Protein-calorie Malnutrition  :: BMI 17 (April 2025)  :: Food scarcity due to limited resources  Plan  -[facility] referral    # Vit D Deficiency  -26 in 2020  Plan:  - Continue w/ VitD 50,000 U Weekly  - Repeat Vitamin D Levels    # Hx of Renal Calculus (2019)  :: Recent CMP wnl (March 2025)  :: Pt notes 3 episodes, s/p lithotripsy?  Plan:  - Monitor electrolytes  - CT Abdomen wo Contrast  - Urinalysis, Uric Acid    #Dental Caries  :: states he has a few cavities,   -barriers to care (transport/money)  -Plan to address at next visit    #Bipolar disorder -remote?  #MDM  ::patient states he was falsely dx w/ bipolar disorder   ::previously on lithium/depakote in the past.   :: No difference in symptoms with/without treatments.  -No active symptoms   -CTM    # Health maintenance  - Last HbA1c: None  - Infectious: Hep C NR 2025, HIV NR 2025  - Vaccinations: Hep B Engerix 1 of 3 today, Tdap Given Today 4/2025, flu NA, VZV (Pending Repeat Titers)   -VZV indeterminate   - Colonoscopy: @45 years  - PSA NA  - Low-dose CT chest: NA   - Bone density:   - AAA screening: Due at  59  Plan:  -Engerix (1/3 doses) administered 4/14/25  -Tdap 4/14/25    Follow-up in 3 Months.    Patient and plan discussed with attending physician Dr. Arias.    Jairon Cardenas MD PhD  Internal Medicine PGY-1  [facility]

## 2025-04-14 NOTE — PATIENT INSTRUCTIONS
Dear Mr. Armenta,     It was a pleasure meeting you today. As discussed today, our plan is:     Imaging: CT Abdomen without Contrast  Lab work: TSH, Vitamin D, Lipid Levels, Varicella Titer, Urinalysis, Uric Acid  Vaccine: TDap, Hepatitis B, Prevnar 20 (at next visit)  Referral: [facility], Neuropsychology     Labs - we collected labs today and will call you with any abnormal results. If you have any questions or concerns prior to us calling feel free to call our office to have your questions addressed.   Medication changes: None  Consultations - you were referred to see the following specialists: Neuropsychology. You should receive a call from central scheduling in the next few days if you do not receive a call within 3-5 business days please call [phone] to schedule your appointment.   4.   If you smoke or use other tobacco products, take steps to quit. Call [phone] for more information or to set up an appointment with  [facility]. The [place] Tobacco Quit Line is a free resource for people who don’t have insurance, receive Medicaid, pregnant women, or members of the [place] [employer] [facility]. Call 2-519-QUIT-NOW or 1-896.706.3755.    Please come back to see us in: 3 Months   ------  If you have any problems or questions, please contact the clinic at [phone] to leave a message. Our fax number is [phone]. If your issue cannot wait until the next business day, please go to urgent care or the emergency department.     I also strongly urge all of my patients to register for [facility] by going to: [email or web address]  (The  staff can also send you a text/email link to register when you check out).    No shows: It is understandable if you are unable to make it to a visit, but please cancel your appointment instead of not showing up. This helps to give other patients access to primary care and keeps wait times low.        [facility]    [phone]

## 2025-04-17 ENCOUNTER — APPOINTMENT (OUTPATIENT)
Dept: INFUSION THERAPY | Facility: CLINIC | Age: 40
End: 2025-04-17
Payer: MEDICAID

## 2025-04-17 VITALS
DIASTOLIC BLOOD PRESSURE: 81 MMHG | HEART RATE: 83 BPM | OXYGEN SATURATION: 97 % | BODY MASS INDEX: 18.53 KG/M2 | TEMPERATURE: 98.8 F | SYSTOLIC BLOOD PRESSURE: 114 MMHG | WEIGHT: 132.83 LBS | RESPIRATION RATE: 16 BRPM

## 2025-04-17 DIAGNOSIS — G35 MS (MULTIPLE SCLEROSIS) (MULTI): ICD-10-CM

## 2025-04-17 PROCEDURE — 96413 CHEMO IV INFUSION 1 HR: CPT | Performed by: NURSE PRACTITIONER

## 2025-04-17 PROCEDURE — 96415 CHEMO IV INFUSION ADDL HR: CPT | Performed by: NURSE PRACTITIONER

## 2025-04-17 PROCEDURE — 96375 TX/PRO/DX INJ NEW DRUG ADDON: CPT | Performed by: NURSE PRACTITIONER

## 2025-04-17 RX ORDER — DIPHENHYDRAMINE HYDROCHLORIDE 50 MG/ML
50 INJECTION, SOLUTION INTRAMUSCULAR; INTRAVENOUS ONCE
OUTPATIENT
Start: 2025-05-01

## 2025-04-17 RX ORDER — EPINEPHRINE 0.3 MG/.3ML
0.3 INJECTION SUBCUTANEOUS EVERY 5 MIN PRN
OUTPATIENT
Start: 2025-05-01

## 2025-04-17 RX ORDER — ACETAMINOPHEN 325 MG/1
650 TABLET ORAL ONCE
OUTPATIENT
Start: 2025-05-01

## 2025-04-17 RX ORDER — DIPHENHYDRAMINE HYDROCHLORIDE 50 MG/ML
50 INJECTION, SOLUTION INTRAMUSCULAR; INTRAVENOUS AS NEEDED
OUTPATIENT
Start: 2025-05-01

## 2025-04-17 RX ORDER — FAMOTIDINE 10 MG/ML
20 INJECTION, SOLUTION INTRAVENOUS ONCE AS NEEDED
OUTPATIENT
Start: 2025-05-01

## 2025-04-17 RX ORDER — DIPHENHYDRAMINE HYDROCHLORIDE 50 MG/ML
50 INJECTION, SOLUTION INTRAMUSCULAR; INTRAVENOUS ONCE
Status: COMPLETED | OUTPATIENT
Start: 2025-04-17 | End: 2025-04-17

## 2025-04-17 RX ORDER — ACETAMINOPHEN 325 MG/1
650 TABLET ORAL ONCE
Status: COMPLETED | OUTPATIENT
Start: 2025-04-17 | End: 2025-04-17

## 2025-04-17 RX ORDER — ALBUTEROL SULFATE 0.83 MG/ML
3 SOLUTION RESPIRATORY (INHALATION) AS NEEDED
OUTPATIENT
Start: 2025-05-01

## 2025-04-17 RX ADMIN — DIPHENHYDRAMINE HYDROCHLORIDE 50 MG: 50 INJECTION, SOLUTION INTRAMUSCULAR; INTRAVENOUS at 09:23

## 2025-04-17 RX ADMIN — ACETAMINOPHEN 650 MG: 325 TABLET ORAL at 09:23

## 2025-04-17 ASSESSMENT — ENCOUNTER SYMPTOMS
ARTHRALGIAS: 1
DIARRHEA: 0
WHEEZING: 0
FREQUENCY: 0
DIZZINESS: 1
FATIGUE: 1
BLOOD IN STOOL: 0
NERVOUS/ANXIOUS: 1
CHILLS: 0
LIGHT-HEADEDNESS: 1
EYE PROBLEMS: 0
COUGH: 0
BRUISES/BLEEDS EASILY: 0
HEADACHES: 1
FEVER: 0
UNEXPECTED WEIGHT CHANGE: 1
ABDOMINAL PAIN: 0
DEPRESSION: 0
LEG SWELLING: 0
VOICE CHANGE: 0
SORE THROAT: 0
SHORTNESS OF BREATH: 1
MYALGIAS: 0
TROUBLE SWALLOWING: 0
APPETITE CHANGE: 0
CONSTIPATION: 1
WOUND: 0
VOMITING: 0
HEMATURIA: 0
NUMBNESS: 1
DYSURIA: 0
NAUSEA: 0
EXTREMITY WEAKNESS: 0
PALPITATIONS: 0

## 2025-04-17 NOTE — PATIENT INSTRUCTIONS
Today :We administered acetaminophen, methylPREDNISolone sod succinate, diphenhydrAMINE, and ocrelizumab (Ocrevus) 300 mg in sodium chloride 0.9% 260 mL IV.     For:   1. MS (multiple sclerosis) (Multi)         Your next appointment is due in:  2 WEEKS        Please read the  Medication Guide that was given to you and reviewed during todays visit.     (Tell all doctors including dentists that you are taking this medication)     Go to the emergency room or call 911 if:  -You have signs of allergic reaction:   -Rash, hives, itching.   -Swollen, blistered, peeling skin.   -Swelling of face, lips, mouth, tongue or throat.   -Tightness of chest, trouble breathing, swallowing or talking     Call your doctor:  - If IV / injection site gets red, warm, swollen, itchy or leaks fluid or pus.     (Leave dressing on your IV site for at least 2 hours and keep area clean and dry  - If you get sick or have symptoms of infection or are not feeling well for any reason.    (Wash your hands often, stay away from people who are sick)  - If you have side effects from your medication that do not go away or are bothersome.     (Refer to the teaching your nurse gave you for side effects to call your doctor about)    - Common side effects may include:  stuffy nose, headache, feeling tired, muscle aches, upset stomach  - Before receiving any vaccines     - Call the [facility] at   If:  - You get sick, are on antibiotics, have had a recent vaccine, have surgery or dental work and your doctor wants your visit rescheduled.  - You need to cancel and reschedule your visit for any reason. Call at least 2 days before your visit if you need to cancel.   - Your insurance changes before your next visit.    (We will need to get approval from your new insurance. This can take up to two weeks.)     The [facility] is opened Monday thru Friday. We are closed on weekends and holidays.   Voice mail will take your call if the  center is closed. If you leave a message please allow 24 hours for a call back during weekdays. If you leave a message on a weekend/holiday, we will call you back the next business day.    A pharmacist is available Monday - Friday from 8:30AM to 3:30PM to help answer any questions you may have about your prescriptions(s). Please call pharmacy at:    [facility]: [phone]  [facility]: [phone]  [facility]: [phone]

## 2025-04-17 NOTE — PROGRESS NOTES
[facility]   Infusion Clinic Note   Date: 2025   Name: Cale Armenta  : 1985   MRN: [id number]         Reason for Visit: OP Infusion and New Patient Visit (NEW PT HERE FOR 'DAY 1' INDUCTION DOSE OF OCREVUS 300 MG INFUSION - NEXT DUE IN 2 WEEKS FOR 'DAY 15')         Today: We administered acetaminophen, methylPREDNISolone sod succinate, diphenhydrAMINE, and ocrelizumab (Ocrevus) 300 mg in sodium chloride 0.9% 260 mL IV.       Ordered By: Chester Justice MD       For a Diagnosis of: MS (multiple sclerosis) (Multi)       At today's visit patient accompanied by: Self      Today's Vitals:   Vitals:    25 0853 25 1033 25 1106 25 1139   BP: 116/83 125/80 128/86 127/77   Pulse: 62 69 75 75   Resp: 18 16 18 16   Temp: 37.3 °C (99.1 °F) 36.9 °C (98.4 °F) 36.5 °C (97.7 °F) 36.8 °C (98.2 °F)   SpO2: 98% 99% 99% 98%   Weight: 60.2 kg (132 lb 13.2 oz)       25 1224 25 1248   BP: 111/78 123/86   Pulse: 73 81   Resp: 16 18   Temp: 36.8 °C (98.3 °F) 36.4 °C (97.5 °F)   SpO2: 97% 99%   Weight:               Pre - Treatment Checklist:      - Previous reaction to current treatment: n/a FIRST DOSE      (Assess patient for the concerns below. Document provider notification as appropriate).  - Active or recent infection with/without current antibiotic use: no  - Recent or planned invasive dental work: no  - Recent or planned surgeries: no  - Recently received or plans to receive vaccinations: no  - Has treatment related toxicities: no  - Any chance may be pregnant:  n/a      Pain: 2- R KNEE   - Is the pain different from normal: no   - Is prescribing Doctor aware:  yes      Labs: Reviewed       Fall Risk Screening: Heidi Fall Risk  History of Falling, Immediate or Within 3 Months: Yes  Secondary Diagnosis: Yes  Ambulatory Aid: Crutches/cane/walker (ROLLATOR)  Intravenous Therapy/Heparin Lock: Yes  Gait/Transferring: Weak  Mental Status: Oriented to own  ability  Gordon Fall Risk Score: 85       Review Of Systems:  Review of Systems   Constitutional:  Positive for fatigue and unexpected weight change. Negative for appetite change, chills and fever.        PT ADMITS TO FATIGUE AT BASELINE  PT ADMITS TO WEIGHT LOSS RECENTLY D/T EATING LESS D/T    HENT:   Negative for hearing loss, mouth sores, sore throat, tinnitus, trouble swallowing and voice change.    Eyes:  Negative for eye problems.        WEARS GLASSES; PT L EYE IS LAZY AND IN R EYE PT IS LOSING COLOR, MD AWARE   Respiratory:  Positive for shortness of breath. Negative for cough and wheezing.         PT ADMITS TO MILD SHORTNESS OF BREATH WHEN SUPINE BUT ALSO SUGGESTS THIS IS D/T ANXIETY   Cardiovascular:  Negative for chest pain, leg swelling and palpitations.   Gastrointestinal:  Positive for constipation. Negative for abdominal pain, blood in stool, diarrhea, nausea and vomiting.        PT ADMITS TO CONSTIPATION RECENTLY D/T EATING LESS   Genitourinary:  Negative for dysuria, frequency and hematuria.    Musculoskeletal:  Positive for arthralgias. Negative for myalgias.        PT ADMITS TO INTERMITTENT PAIN IN R KNEE AT BASELINE   Skin:  Negative for itching, rash and wound.   Neurological:  Positive for dizziness, headaches, light-headedness and numbness. Negative for extremity weakness.        PT ADMITS TO INTERMITTENT DIZZINESS/LIGHTHEADEDNESS AT BASELINE  PT ADMITS TO HEADACHES D/T STRESS  PT ADMITS TO N/T IN FINGERS AT BASELINE   Hematological:  Does not bruise/bleed easily.   Psychiatric/Behavioral:  Negative for depression. The patient is nervous/anxious.         PT ADMITS TO SOME ANXIETY, NOT ON MEDICATIONS         Infusion Readiness:  - Assessment Concerns Related to Infusion: No  - Provider notified: n/a      New Patient Education:    N/A (returning patient for continuation of therapy. Ongoing education provided as needed.)        Treatment Conditions & Drug Specific Questions:    Ocrelizumab   "(OCREVUS)    (Unless otherwise specified on patient specific therapy plan):     TREATMENT CONDITIONS:  Unless otherwise specified on patient specific therapy plan HOLD and notify provider prior to proceeding with today's infusion if patient with:  o Positive Hepatitis B Surface Ag or panel  o Positive T-Spot    Lab Results   Component Value Date    TBSIN Negative 03/12/2025      Lab Results   Component Value Date    HEPBSAG Nonreactive 03/12/2025      Lab Results   Component Value Date    HEPBCAB Nonreactive 03/12/2025    HEPCAB Nonreactive 03/12/2025      No results found for: \"NONUHFIRE\", \"NONUHSWGH\", \"NONUHFISH\", \"EXTHEPBSAG\"    Patient meets treatment conditions? No    DRUG SPECIFIC QUESTIONS:  Any signs or symptoms of Progressive multifocal leukoencephalopathy (PML) which may include: memory loss, trouble thinking, dizziness, loss of balance, difficulty talking / walking or loss of vision? FIRST DOSE- AT BASELINE PT HAS INTERMITTENT DIZZINESS AND HAS ISSUES WITH VISION    (If YES HOLD and notify provider)      REMINDER:  Recommended Vitals/Observation:  Vitals: Obtain vital signs every 30 minutes / with each ramp up. Once maximum rate is reached obtain vitals hourly until infusion is complete. Obtain vitals at end of observation period and PRN.  Observation: Observe patient for 60 minutes after each infusion. Observation complete.          Weight Based Drug Calculations:    WEIGHT BASED DRUGS: NOT APPLICABLE / FLAT DOSE       Post Treatment: Patient tolerated treatment without issue and was discharged in no apparent distress.      Note Authored / Patient Cared for By: Briana Potter RN        "

## 2025-04-18 ENCOUNTER — TELEPHONE (OUTPATIENT)
Dept: NEUROLOGY | Facility: CLINIC | Age: 40
End: 2025-04-18
Payer: MEDICAID

## 2025-04-18 NOTE — TELEPHONE ENCOUNTER
Cale Armenta called. He had his first Occrevus yesterday. He had significant fatigue and BLE weakness ( causing a fall and urine/ stool incontinence because he could not get himself to the restroom) The issue resolved to baseline at 830pm. Today Friday morning he is able to ambulate and back to baseline. I offered to have Zohreh call back. He declined, he wants to speak to you directly . Please call him at [phone] (home)

## 2025-04-25 NOTE — TELEPHONE ENCOUNTER
Cale Armenta called. He would like you to give him a call back today regarding the side effect he has following his Occrevus infusion. He continues to decline to speak to Pharm D or DNP. He stated he has to discuss his treatment with you only then he has to report to to his court appointed adjucator and his  with updates on his health.

## 2025-05-02 ENCOUNTER — APPOINTMENT (OUTPATIENT)
Dept: INFUSION THERAPY | Facility: CLINIC | Age: 40
End: 2025-05-02
Payer: MEDICAID

## 2025-05-02 VITALS
SYSTOLIC BLOOD PRESSURE: 117 MMHG | WEIGHT: 131.17 LBS | TEMPERATURE: 97.2 F | BODY MASS INDEX: 18.3 KG/M2 | HEART RATE: 83 BPM | RESPIRATION RATE: 18 BRPM | OXYGEN SATURATION: 99 % | DIASTOLIC BLOOD PRESSURE: 74 MMHG

## 2025-05-02 DIAGNOSIS — G35 MS (MULTIPLE SCLEROSIS) (MULTI): ICD-10-CM

## 2025-05-02 RX ORDER — DIPHENHYDRAMINE HYDROCHLORIDE 50 MG/ML
50 INJECTION, SOLUTION INTRAMUSCULAR; INTRAVENOUS ONCE
Status: COMPLETED | OUTPATIENT
Start: 2025-05-02 | End: 2025-05-02

## 2025-05-02 RX ORDER — EPINEPHRINE 0.3 MG/.3ML
0.3 INJECTION SUBCUTANEOUS EVERY 5 MIN PRN
OUTPATIENT
Start: 2025-05-15

## 2025-05-02 RX ORDER — ACETAMINOPHEN 325 MG/1
650 TABLET ORAL ONCE
Status: COMPLETED | OUTPATIENT
Start: 2025-05-02 | End: 2025-05-02

## 2025-05-02 RX ORDER — ALBUTEROL SULFATE 0.83 MG/ML
3 SOLUTION RESPIRATORY (INHALATION) AS NEEDED
OUTPATIENT
Start: 2025-05-15

## 2025-05-02 RX ORDER — FAMOTIDINE 10 MG/ML
20 INJECTION, SOLUTION INTRAVENOUS ONCE AS NEEDED
OUTPATIENT
Start: 2025-05-15

## 2025-05-02 RX ORDER — DIPHENHYDRAMINE HYDROCHLORIDE 50 MG/ML
50 INJECTION, SOLUTION INTRAMUSCULAR; INTRAVENOUS AS NEEDED
OUTPATIENT
Start: 2025-05-15

## 2025-05-02 RX ORDER — DIPHENHYDRAMINE HYDROCHLORIDE 50 MG/ML
50 INJECTION, SOLUTION INTRAMUSCULAR; INTRAVENOUS ONCE
Status: CANCELLED | OUTPATIENT
Start: 2025-05-15

## 2025-05-02 RX ORDER — ACETAMINOPHEN 325 MG/1
650 TABLET ORAL ONCE
Status: CANCELLED | OUTPATIENT
Start: 2025-05-15

## 2025-05-02 RX ADMIN — ACETAMINOPHEN 650 MG: 325 TABLET ORAL at 09:41

## 2025-05-02 RX ADMIN — DIPHENHYDRAMINE HYDROCHLORIDE 50 MG: 50 INJECTION, SOLUTION INTRAMUSCULAR; INTRAVENOUS at 09:41

## 2025-05-02 ASSESSMENT — ENCOUNTER SYMPTOMS
NAUSEA: 0
ARTHRALGIAS: 1
SORE THROAT: 0
DEPRESSION: 0
VOMITING: 0
APPETITE CHANGE: 0
DIARRHEA: 0
UNEXPECTED WEIGHT CHANGE: 1
FEVER: 0
MYALGIAS: 0
HEMATURIA: 0
FREQUENCY: 0
DIZZINESS: 1
SHORTNESS OF BREATH: 1
CHILLS: 0
NUMBNESS: 1
PALPITATIONS: 0
HEADACHES: 1
WHEEZING: 0
CONSTIPATION: 1
LEG SWELLING: 0
EYE PROBLEMS: 0
TROUBLE SWALLOWING: 0
ABDOMINAL PAIN: 0
LIGHT-HEADEDNESS: 1
WOUND: 0
DYSURIA: 0
EXTREMITY WEAKNESS: 1
BRUISES/BLEEDS EASILY: 0
FATIGUE: 1
NERVOUS/ANXIOUS: 1
VOICE CHANGE: 0
COUGH: 0
BLOOD IN STOOL: 0

## 2025-05-02 NOTE — PROGRESS NOTES
[facility]   Infusion Clinic Note   Date: May 2, 2025   Name: Cale Armenta  : 1985   MRN: [id number]         Reason for Visit: OP Infusion and Follow-up (PT HERE FOR OCREVUS 300 MG INFUSION, 2ND INDUCTION DOSE/NEXT APPT: 180 DAYS)         Today: We administered acetaminophen, methylPREDNISolone sod succinate, diphenhydrAMINE, and ocrelizumab (Ocrevus) 300 mg in sodium chloride 0.9% 260 mL IV.       Ordered By: Chester Justice MD       For a Diagnosis of: MS (multiple sclerosis) (Multi)       At today's visit patient accompanied by: Self      Today's Vitals:   Vitals:    25 0929 25 1041 25 1113 25 1145   BP: 127/82 114/75 120/80 116/81   Pulse: 76 76 78 79   Resp: 18 18 18 18   Temp: 36.2 °C (97.2 °F) 36.5 °C (97.7 °F) 36.7 °C (98 °F) 36.4 °C (97.5 °F)   SpO2: 98% 99% 98% 99%   Weight: 59.5 kg (131 lb 2.8 oz)       25 1218 25 1257 25 1400   BP: 113/78 117/80 117/74   Pulse: 72 73 83   Resp: 18 18 18   Temp: 36.5 °C (97.7 °F) 36.5 °C (97.7 °F) 36.2 °C (97.2 °F)   SpO2: 99% 99%    Weight:                  Pre - Treatment Checklist:      - Previous reaction to current treatment: YES AFTER FIRST INFUSION PATIENT COULD NOT WALK FOR 8 HOURS POST INFUSION. MIREYA BLEDSOE CNP NOTIFIED AND OKAY TO PROCEED WITH INFUSION TODAY. PATIENT WAS ENCOURAGED TO GET UP AND WALK AROUND DURING TODAYS INFUSION. EDUCATED ON SE FATIGUE. PT WALKS WITH WALKER AT BASELINE. INSTRUCTED TO CONTACT NEUROLOGIST IF THIS OCCURS AGAIN AFTER INFUSION.     (AGREE WITH ABOVE DELFIN Siu)     (Assess patient for the concerns below. Document provider notification as appropriate).  - Active or recent infection with/without current antibiotic use: no  - Recent or planned invasive dental work: no  - Recent or planned surgeries: no  - Recently received or plans to receive vaccinations: no  - Has treatment related toxicities: NO  - Any chance may be pregnant:  n/a       Pain: 2- R KNEE   - Is the pain different from normal: no   - Is prescribing Doctor aware:  yes      Labs: Reviewed       Fall Risk Screening: Gordon Fall Risk  History of Falling, Immediate or Within 3 Months: Yes  Secondary Diagnosis: Yes  Ambulatory Aid: Crutches/cane/walker  Intravenous Therapy/Heparin Lock: Yes  Gait/Transferring: Weak  Mental Status: Oriented to own ability  Gordon Fall Risk Score: 85       Review Of Systems:  Review of Systems   Constitutional:  Positive for fatigue and unexpected weight change. Negative for appetite change, chills and fever.        PT ADMITS TO FATIGUE AT BASELINE  PT ADMITS TO WEIGHT LOSS RECENTLY D/T EATING LESS D/T    HENT:   Negative for hearing loss, mouth sores, sore throat, tinnitus, trouble swallowing and voice change.    Eyes:  Negative for eye problems.        WEARS GLASSES; PT L EYE IS LAZY AND IN R EYE PT IS LOSING COLOR, MD AWARE   Respiratory:  Positive for shortness of breath. Negative for cough and wheezing.         PT ADMITS TO MILD SHORTNESS OF BREATH WHEN SUPINE BUT ALSO SUGGESTS THIS IS D/T ANXIETY   Cardiovascular:  Negative for chest pain, leg swelling and palpitations.   Gastrointestinal:  Positive for constipation. Negative for abdominal pain, blood in stool, diarrhea, nausea and vomiting.        PT ADMITS TO CONSTIPATION RECENTLY D/T EATING LESS   Genitourinary:  Negative for dysuria, frequency and hematuria.    Musculoskeletal:  Positive for arthralgias. Negative for myalgias.        PT ADMITS TO INTERMITTENT PAIN IN R KNEE AT BASELINE   Skin:  Negative for itching, rash and wound.   Neurological:  Positive for dizziness, extremity weakness, headaches, light-headedness and numbness.        PT ADMITS TO INTERMITTENT DIZZINESS/LIGHTHEADEDNESS AT BASELINE  PT ADMITS TO HEADACHES D/T STRESS  PT ADMITS TO N/T IN FINGERS AT BASELINE   Hematological:  Does not bruise/bleed easily.   Psychiatric/Behavioral:  Negative for depression. The patient is  "nervous/anxious.         PT ADMITS TO SOME ANXIETY, NOT ON MEDICATIONS         Infusion Readiness:  - Assessment Concerns Related to Infusion: YES AFTER FIRST INFUSION PATIENT COULD NOT WALK FOR 8 HOURS POST INFUSION. MIREYA BLEDSOE CNP NOTIFIED AND OKAY TO PROCEED WITH INFUSION TODAY. PATIENT WAS EDUCATED TO CONTACT NEUROLOGIST IF THIS OCCURS AGAIN AFTER INFUSION.  - Provider notified: n/a      New Patient Education:    N/A (returning patient for continuation of therapy. Ongoing education provided as needed.)        Treatment Conditions & Drug Specific Questions:    Ocrelizumab  (OCREVUS)    (Unless otherwise specified on patient specific therapy plan):     TREATMENT CONDITIONS:  Unless otherwise specified on patient specific therapy plan HOLD and notify provider prior to proceeding with today's infusion if patient with:  o Positive Hepatitis B Surface Ag or panel  o Positive T-Spot    Lab Results   Component Value Date    TBSIN Negative 03/12/2025      Lab Results   Component Value Date    HEPBSAG Nonreactive 03/12/2025      Lab Results   Component Value Date    HEPBCAB Nonreactive 03/12/2025    HEPCAB Nonreactive 03/12/2025      No results found for: \"NONUHFIRE\", \"NONUHSWGH\", \"NONUHFISH\", \"EXTHEPBSAG\"    Patient meets treatment conditions? No    DRUG SPECIFIC QUESTIONS:  Any signs or symptoms of Progressive multifocal leukoencephalopathy (PML) which may include: memory loss, trouble thinking, dizziness, loss of balance, difficulty talking / walking or loss of vision? NO. PT ADMITS TO INTERMITTENT DIZZINESS/LIGHTHEADEDNESS AT BASELINE. FATIGUE / WEAKNESS AT BASELINE AMBULATES WITH ROLLATOR.     AFTER FIRST INFUSION PATIENT COULD NOT WALK FOR 8 HOURS POST INFUSION. MIREYA BLEDSOE CNP NOTIFIED AND OKAY TO PROCEED WITH INFUSION TODAY. PATIENT WAS ENCOURAGED TO GET UP AND WALK AROUND DURING TODAYS INFUSION. EDUCATED ON SE FATIGUE. PT WALKS WITH WALKER AT BASELINE. INSTRUCTED TO CONTACT NEUROLOGIST IF THIS OCCURS " AGAIN AFTER INFUSION.    (AGREE WITH ABOVE. DID SEND MESSAGE TO DR BEST AND TEAM TO NOTIFY ALEKSANDAR Siu-CNP)      (If YES HOLD and notify provider)      REMINDER:  Recommended Vitals/Observation:  Vitals: Obtain vital signs every 30 minutes / with each ramp up. Once maximum rate is reached obtain vitals hourly until infusion is complete. Obtain vitals at end of observation period and PRN.  Observation: Observe patient for 60 minutes after each infusion. Observation complete.          Weight Based Drug Calculations:    WEIGHT BASED DRUGS: NOT APPLICABLE / FLAT DOSE       Post Treatment: Patient tolerated treatment without issue and was discharged in no apparent distress.      Note Authored / Patient Cared for By: Jennifer Lam RN

## 2025-05-02 NOTE — PATIENT INSTRUCTIONS
Today :We administered acetaminophen, methylPREDNISolone sod succinate, and diphenhydrAMINE.     For:   1. MS (multiple sclerosis) (Multi)         Your next appointment is due in:  180 DAYS        Please read the  Medication Guide that was given to you and reviewed during todays visit.     (Tell all doctors including dentists that you are taking this medication)     Go to the emergency room or call 911 if:  -You have signs of allergic reaction:   -Rash, hives, itching.   -Swollen, blistered, peeling skin.   -Swelling of face, lips, mouth, tongue or throat.   -Tightness of chest, trouble breathing, swallowing or talking     Call your doctor:  - If IV / injection site gets red, warm, swollen, itchy or leaks fluid or pus.     (Leave dressing on your IV site for at least 2 hours and keep area clean and dry  - If you get sick or have symptoms of infection or are not feeling well for any reason.    (Wash your hands often, stay away from people who are sick)  - If you have side effects from your medication that do not go away or are bothersome.     (Refer to the teaching your nurse gave you for side effects to call your doctor about)    - Common side effects may include:  stuffy nose, headache, feeling tired, muscle aches, upset stomach  - Before receiving any vaccines     - Call [facility] at   If:  - You get sick, are on antibiotics, have had a recent vaccine, have surgery or dental work and your doctor wants your visit rescheduled.  - You need to cancel and reschedule your visit for any reason. Call at least 2 days before your visit if you need to cancel.   - Your insurance changes before your next visit.    (We will need to get approval from your new insurance. This can take up to two weeks.)     The [facility] is opened Monday thru Friday. We are closed on weekends and holidays.   Voice mail will take your call if the center is closed. If you leave a message please allow 24 hours  for a call back during weekdays. If you leave a message on a weekend/holiday, we will call you back the next business day.    A pharmacist is available Monday - Friday from 8:30AM to 3:30PM to help answer any questions you may have about your prescriptions(s). Please call pharmacy at:    [facility]: [phone]  [facility]: [phone]  [facility]: [phone]

## 2025-05-12 ENCOUNTER — APPOINTMENT (OUTPATIENT)
Dept: NEUROLOGY | Facility: HOSPITAL | Age: 40
End: 2025-05-12
Payer: MEDICAID

## 2025-05-14 ENCOUNTER — CLINICAL SUPPORT (OUTPATIENT)
Dept: PRIMARY CARE | Facility: HOSPITAL | Age: 40
End: 2025-05-14
Payer: MEDICAID

## 2025-05-14 DIAGNOSIS — Z23 ENCOUNTER FOR IMMUNIZATION: ICD-10-CM

## 2025-05-14 DIAGNOSIS — Z23 NEED FOR HEPATITIS B VACCINATION: Primary | ICD-10-CM

## 2025-05-14 PROCEDURE — 99211 OFF/OP EST MAY X REQ PHY/QHP: CPT | Mod: 25 | Performed by: INTERNAL MEDICINE

## 2025-05-14 PROCEDURE — 90746 HEPB VACCINE 3 DOSE ADULT IM: CPT | Performed by: INTERNAL MEDICINE

## 2025-05-14 NOTE — PROGRESS NOTES
Cale Armenta presents to [facility] on the nurses schedule as an established patient for Hepatitis B vaccination dose 2 of 3.  A & O x 4, identifiers x 2, ambulates with rollator. Allergies reviewed reports no adverse reactions or side effects to previous vaccinations. Reports no cold, flu, or COVID symptoms at the time of visit. Patient information given and received with the first injection. No questions at this time, Hepatitis B dose 2 of 3, 20 mcg injected left deltoid intramuscularly patient tolerated well. Appointment for third dose scheduled prior to leaving. Discharged from clinic in no acute distress.

## 2025-06-03 PROCEDURE — RXMED WILLOW AMBULATORY MEDICATION CHARGE

## 2025-06-04 ENCOUNTER — PHARMACY VISIT (OUTPATIENT)
Dept: PHARMACY | Facility: CLINIC | Age: 40
End: 2025-06-04
Payer: MEDICAID

## 2025-07-14 ENCOUNTER — OFFICE VISIT (OUTPATIENT)
Dept: PRIMARY CARE | Facility: HOSPITAL | Age: 40
End: 2025-07-14
Payer: MEDICAID

## 2025-07-14 ENCOUNTER — HOSPITAL ENCOUNTER (OUTPATIENT)
Dept: RADIOLOGY | Facility: HOSPITAL | Age: 40
Discharge: HOME | End: 2025-07-14
Payer: MEDICAID

## 2025-07-14 VITALS
BODY MASS INDEX: 18.34 KG/M2 | TEMPERATURE: 96.4 F | SYSTOLIC BLOOD PRESSURE: 134 MMHG | WEIGHT: 131 LBS | HEIGHT: 71 IN | OXYGEN SATURATION: 97 % | DIASTOLIC BLOOD PRESSURE: 91 MMHG | HEART RATE: 80 BPM

## 2025-07-14 DIAGNOSIS — G35 MS (MULTIPLE SCLEROSIS) (MULTI): Primary | ICD-10-CM

## 2025-07-14 DIAGNOSIS — Z87.442 HISTORY OF NEPHROLITHIASIS: ICD-10-CM

## 2025-07-14 DIAGNOSIS — Z23 NEED FOR PNEUMOCOCCAL 20-VALENT CONJUGATE VACCINATION: ICD-10-CM

## 2025-07-14 PROCEDURE — 74176 CT ABD & PELVIS W/O CONTRAST: CPT

## 2025-07-14 PROCEDURE — 74176 CT ABD & PELVIS W/O CONTRAST: CPT | Performed by: RADIOLOGY

## 2025-07-14 ASSESSMENT — ENCOUNTER SYMPTOMS
LOSS OF SENSATION IN FEET: 0
OCCASIONAL FEELINGS OF UNSTEADINESS: 0
DEPRESSION: 0

## 2025-07-14 ASSESSMENT — PATIENT HEALTH QUESTIONNAIRE - PHQ9
1. LITTLE INTEREST OR PLEASURE IN DOING THINGS: NOT AT ALL
2. FEELING DOWN, DEPRESSED OR HOPELESS: NOT AT ALL
SUM OF ALL RESPONSES TO PHQ9 QUESTIONS 1 AND 2: 0

## 2025-07-14 ASSESSMENT — PAIN SCALES - GENERAL: PAINLEVEL_OUTOF10: 0-NO PAIN

## 2025-07-14 NOTE — PROGRESS NOTES
[facility]    HPI:  Cale Armenta is a 40 y.o. male with history of recently worsening multiple sclerosis who presents for follow up visit. Patient was last seen in [facility] on 04/25.     Patient reports that since his last appointment he has been about the same. Denies any new or worsening symptoms. He reports that he continues to have difficulty ambulating. He has been on octerovus without much improvement in his symptoms. He also continues to endorse food insecurity. He states his [employer] friends will occasionally drop off food to his door. He attempted to establish with [employer], however they require him to go  groceries which he is not able to do.     Health maintenance:  Health Maintenance   Topic Date Due    MMR Vaccines (1 of 1 - Standard series) Never done    COVID-19 Vaccine (1) Never done    Varicella Vaccines (1 of 2 - 13+ 2-dose series) Never done    Pneumococcal Vaccine: Pediatrics and At-Risk Adult Patients (1 of 2 - PCV) Never done    Yearly Adult Physical  11/17/2017    Lipid Panel  09/26/2024    Influenza Vaccine (1) 09/01/2025    Hepatitis B Vaccines (3 of 3 - 19+ 3-dose series) 10/14/2025    Zoster Vaccines (1 of 2) 05/26/2034    DTaP/Tdap/Td Vaccines (2 - Td or Tdap) 04/14/2035    HPV Vaccines (No Doses Required) Completed    HIV Screening  Completed    Hepatitis C Screening  Completed    HIB Vaccines  Aged Out    IPV Vaccines  Aged Out    Hepatitis A Vaccines  Aged Out    Meningococcal Vaccine  Aged Out    Rotavirus Vaccines  Aged Out       Medications:    Current Outpatient Medications:     ergocalciferol (Vitamin D-2) 1250 mcg (50,000 units) capsule, Take 1 capsule (1,250 mcg) by mouth 1 (one) time per week., Disp: 25 capsule, Rfl: 1    ocrelizumab (Ocrevus) 30 mg/mL, Infuse 10 mL (300 mg total) into a venous catheter every 14 (fourteen) days.  at day 1 and 15, Disp: 10 mL, Rfl: 1    walker (Ultra-Light Rollator) misc, 1 each once daily., Disp: 1 each, Rfl:  0    Allergies:  No Known Allergies    Past medical history:  Past Medical History:   Diagnosis Date    Personal history of urinary calculi 09/26/2019    History of renal calculi       Surgical history:  Past Surgical History:   Procedure Laterality Date    ELBOW SURGERY  12/29/2014    Elbow Surgery    MOUTH SURGERY  12/29/2014    Oral Surgery Tooth Extraction       Family history:  No family history on file.    Social history:   reports that he has never smoked. He has never used smokeless tobacco. He reports that he does not drink alcohol and does not use drugs.    Social History     Social History Narrative    Not on file       Review of systems:  Constitutional: negative for fevers, chills, weight loss, weight gain, change in appetite, fatigue, weakness.  HEENT: negative for headache, changes in vision or hearing, congestion, sore throat.  Respiratory: negative for SOB, cough, hemoptysis, wheezing  Cardiovascular: negative for chest pain, palpitations, orthopnea, PND  GI: negative for dysphagia, abdominal pain, nausea, vomiting, diarrhea, constipation, melena, hematochezia, BRBPR  : negative for frequency, urgency, dysuria, hematuria, incontinence  MSK: negative for myalgia, arthralgia, decreased joint ROM, LE swelling  Skin: negative for rash, wounds  Heme/lymph: negative for easy bruising, bleeding, epistaxis  Neuro: negative for LOC, numbness, tingling, tremor, vertigo, dizziness    Vitals:  There were no vitals filed for this visit.      Physical exam:  Constitutional: Thin-appearing male in no acute distress.   HEENT: Normocephalic, atraumatic. PERRL. EOMI. No cervical lymphadenopathy.  Respiratory: CTA bilaterally. No wheezes, rales, or rhonchi. Normal respiratory effort.  Cardiovascular: RRR. No murmurs, gallops, or rubs. No JVD. Radial pulses 2+.  Abdominal: Soft, nondistended, nontender to palpation. Bowel sounds present. No hepatosplenomegaly or masses. No CVA tenderness.  Neuro: CN II-XII intact. UE  and LE strength 5/5 bilaterally (lower and upper) and sensation mainly 'altered' on R. +Spasticity and visible increased effort on R.  MSK: No LE edema bilaterally.  Skin: Warm, dry. No rashes or wounds.  Psych: Appropriate mood and affect.    Labs:  No results found for this or any previous visit (from the past 24 hours).    Imaging:  Imaging  No results found.    Cardiology, Vascular, and Other Imaging  No other imaging results found for the past 7 days    Assessment and Plan:  Cale Armenta is a 40 y.o. male with history of food insecurity here to establish primary care prior to initiating DMT (Ocrevus) for multiple sclerosis. Chief complaints mainly request for referrals and healthcare maintenance goals. Vitals notable for BMI 17 iso food scarcity due to financial reasons. Patient educated and informed of [employer] services. Planned to initiate Ocrevus 4/17: after review of pre-DMT labs - Plan to administer vaccines and repeat blood work.    Summary for 07/14/25:  -Lab work: Vit D, TSH, Lipid Panel, VZV Titer, UA, Uric Acid collected in office   -CT Abdomen wo contrast pending   -Vaccine: Prevnar 20 given   -Meals on wheels discussed with social work     # MS  :: Follows w/ Neurology (Dr. Chester Justice)  :: Planned for Ocrevus infusion, Hep B non-immune  Plan:  -Monitor for infections on Ocrevus (Planned initiation 4/17)  -C/w VitD Supplementation    #Protein-calorie Malnutrition  :: BMI 17 (April 2025)  :: Food scarcity due to limited resources  Plan  -[employer] referral    # Vit D Deficiency  -26 in 2020  Plan:  - Continue w/ VitD 50,000 U Weekly  - Repeat Vitamin D Levels    # Hx of Renal Calculus (2019)  :: Recent CMP wnl (March 2025)  :: Pt notes 3 episodes, s/p lithotripsy?  Plan:  #Nephrolithiasis  Extensive nephrolithiasis history   Vitamin D supplementation per neurology  Patient with unclear stone type history (does think possibly uric acid?)  Plan:   [  ] CT to assess stone burden  [  ]  serum uric acid, Vitamin D level, urinalysis   [  ] consider further metabolic workup, uro or nephro referral pending CT results    #Dental Caries  :: states he has a few cavities,   -barriers to care (transport/money)  -Plan to address at next visit    #Bipolar disorder -remote?  #MDM  ::patient states he was falsely dx w/ bipolar disorder   ::previously on lithium/depakote in the past.   :: No difference in symptoms with/without treatments.  [  ] Neuropsychology referral for assessment  [  ] consider concurrent psychiatry referral pending results and response to treatment     # Health maintenance  - Last HbA1c: None  - Infectious: Hep C NR 2025, HIV NR 2025  - Vaccinations: Hep B Engerix 1 of 3 today, Tdap Given Today 4/2025, flu NA, VZV (Pending Repeat Titers)   -VZV indeterminate   - Colonoscopy: @45 years  - PSA NA  - Low-dose CT chest: NA   - Bone density:   - AAA screening: Due at 60  Plan:  -Engerix (1/3 doses) administered 4/14/25  -Tdap 4/14/25  -Prevnar 20 given 7/14/25     Follow-up in 3 Months.    Patient and plan discussed with attending physician Dr. Arias.    Anamika Jett MD  PGY-3 Internal Medicine  [facility]

## 2025-07-14 NOTE — PATIENT INSTRUCTIONS
Mr. Armenta,     It was our pleasure taking care of you in the [facility] today! Here are the things we discussed:     We will collect the blood work including repeat lipid panel, thyroid studies, urine studies, varicella, Vitamin D today. Please get your CT scan afterwards.   2.   Please see the neuropsychologist outpatient, you can try calling the scheduling number ([phone]).   3.   You spoke to our  about meals on wheels.     Please reach out to the clinic with any questions or concerns at [phone]. We will see you back in clinic in 3 months.

## 2025-07-17 ENCOUNTER — TELEPHONE (OUTPATIENT)
Dept: NEUROLOGY | Facility: CLINIC | Age: 40
End: 2025-07-17
Payer: MEDICAID

## 2025-07-17 DIAGNOSIS — G35 MS (MULTIPLE SCLEROSIS) (MULTI): Primary | ICD-10-CM

## 2025-07-17 LAB
25(OH)D3+25(OH)D2 SERPL-MCNC: 80 NG/ML (ref 30–100)
CHOLEST SERPL-MCNC: 181 MG/DL
CHOLEST/HDLC SERPL: 3.4 (CALC)
COLOR UR: NORMAL
HDLC SERPL-MCNC: 54 MG/DL
LDLC SERPL CALC-MCNC: 112 MG/DL (CALC)
NONHDLC SERPL-MCNC: 127 MG/DL (CALC)
TRIGL SERPL-MCNC: 67 MG/DL
TSH SERPL-ACNC: 0.52 MIU/L (ref 0.4–4.5)
URATE SERPL-MCNC: 3.9 MG/DL (ref 4–8)
VZV IGG SER IA-ACNC: 4.17 S/CO

## 2025-07-17 NOTE — TELEPHONE ENCOUNTER
Cale Armenta called. He would like to request a Functional Capacity Evaluation be ordered. He has a questionnaire from his  that needs to be completed for disability. Please advise I will call him back with the OT/ FCE order information.

## 2025-08-27 PROCEDURE — RXMED WILLOW AMBULATORY MEDICATION CHARGE

## 2025-08-28 ENCOUNTER — PHARMACY VISIT (OUTPATIENT)
Dept: PHARMACY | Facility: CLINIC | Age: 40
End: 2025-08-28
Payer: MEDICAID

## 2025-09-23 ENCOUNTER — APPOINTMENT (OUTPATIENT)
Dept: NEUROLOGY | Facility: HOSPITAL | Age: 40
End: 2025-09-23
Payer: MEDICAID

## 2025-10-14 ENCOUNTER — APPOINTMENT (OUTPATIENT)
Dept: PRIMARY CARE | Facility: HOSPITAL | Age: 40
End: 2025-10-14
Payer: MEDICAID

## 2025-10-17 ENCOUNTER — APPOINTMENT (OUTPATIENT)
Dept: INFUSION THERAPY | Facility: CLINIC | Age: 40
End: 2025-10-17
Payer: MEDICAID

## 2025-11-03 ENCOUNTER — APPOINTMENT (OUTPATIENT)
Dept: INFUSION THERAPY | Facility: CLINIC | Age: 40
End: 2025-11-03
Payer: MEDICAID

## 2025-11-10 ENCOUNTER — APPOINTMENT (OUTPATIENT)
Dept: INFUSION THERAPY | Facility: CLINIC | Age: 40
End: 2025-11-10
Payer: MEDICAID

## 2025-11-18 ENCOUNTER — APPOINTMENT (OUTPATIENT)
Dept: INFUSION THERAPY | Facility: CLINIC | Age: 40
End: 2025-11-18
Payer: MEDICAID

## 2025-11-25 ENCOUNTER — APPOINTMENT (OUTPATIENT)
Dept: INFUSION THERAPY | Facility: CLINIC | Age: 40
End: 2025-11-25
Payer: MEDICAID